# Patient Record
Sex: MALE | Race: WHITE | NOT HISPANIC OR LATINO | Employment: OTHER | URBAN - METROPOLITAN AREA
[De-identification: names, ages, dates, MRNs, and addresses within clinical notes are randomized per-mention and may not be internally consistent; named-entity substitution may affect disease eponyms.]

---

## 2017-06-02 ENCOUNTER — TELEPHONE (OUTPATIENT)
Dept: MEDICAL GROUP | Facility: PHYSICIAN GROUP | Age: 37
End: 2017-06-02

## 2017-06-02 NOTE — TELEPHONE ENCOUNTER
ESTABLISHED PATIENT PRE-VISIT PLANNING     Note: Patient will not be contacted if there is no indication to call.     1.  Reviewed notes from the last few office visits within the medical group: Yes    2.  If any orders were placed at last visit or intended to be done for this visit (i.e. 6 mos follow-up), do we have Results/Consult Notes?        •  Labs - Labs were not ordered at last office visit.       •  Imaging - Imaging was not ordered at last office visit.       •  Referrals - No referrals were ordered at last office visit.    3. Is this appointment scheduled as a Hospital Follow-Up? No    4.  Immunizations were updated in [employer] using WebIZ?: Yes       •  Web Iz Recommendations: Patient is up to date on all vaccines    5.  Patient is due for the following Health Maintenance Topics:   Health Maintenance Due   Topic Date Due   • Annual Wellness Visit  08/17/2016       - Patient is up-to-date on all Health Maintenance topics. No records have been requested at this time.    6.  Patient was not informed to arrive 15 min prior to their scheduled appointment and bring in their medication bottles.

## 2017-06-05 ENCOUNTER — OFFICE VISIT (OUTPATIENT)
Dept: MEDICAL GROUP | Facility: PHYSICIAN GROUP | Age: 37
End: 2017-06-05
Payer: MEDICARE

## 2017-06-05 VITALS
DIASTOLIC BLOOD PRESSURE: 80 MMHG | HEART RATE: 95 BPM | OXYGEN SATURATION: 94 % | BODY MASS INDEX: 19.99 KG/M2 | TEMPERATURE: 99.4 F | HEIGHT: 69 IN | SYSTOLIC BLOOD PRESSURE: 110 MMHG | WEIGHT: 135 LBS

## 2017-06-05 DIAGNOSIS — F17.200 SMOKER: ICD-10-CM

## 2017-06-05 DIAGNOSIS — I69.321 DYSPHASIA AS LATE EFFECT OF CEREBROVASCULAR ACCIDENT (CVA): ICD-10-CM

## 2017-06-05 DIAGNOSIS — I69.959 HEMIPLEGIA OF DOMINANT SIDE, LATE EFFECT OF CEREBROVASCULAR DISEASE (HCC): ICD-10-CM

## 2017-06-05 DIAGNOSIS — Z99.89 USE OF CANE AS AMBULATORY AID: ICD-10-CM

## 2017-06-05 DIAGNOSIS — I69.398 ABNORMALITY OF GAIT AS LATE EFFECT OF CEREBROVASCULAR ACCIDENT (CVA): ICD-10-CM

## 2017-06-05 DIAGNOSIS — I69.391 DYSPHAGIA AS LATE EFFECT OF CEREBROVASCULAR ACCIDENT (CVA): ICD-10-CM

## 2017-06-05 DIAGNOSIS — I69.392 FACIAL WEAKNESS AS LATE EFFECT OF CEREBROVASCULAR ACCIDENT (CVA): ICD-10-CM

## 2017-06-05 DIAGNOSIS — R26.9 ABNORMALITY OF GAIT AS LATE EFFECT OF CEREBROVASCULAR ACCIDENT (CVA): ICD-10-CM

## 2017-06-05 PROCEDURE — G8432 DEP SCR NOT DOC, RNG: HCPCS | Performed by: FAMILY MEDICINE

## 2017-06-05 PROCEDURE — 99214 OFFICE O/P EST MOD 30 MIN: CPT | Performed by: FAMILY MEDICINE

## 2017-06-05 PROCEDURE — G8599 NO ASA/ANTIPLAT THER USE RNG: HCPCS | Performed by: FAMILY MEDICINE

## 2017-06-05 PROCEDURE — 4004F PT TOBACCO SCREEN RCVD TLK: CPT | Mod: 8P | Performed by: FAMILY MEDICINE

## 2017-06-05 PROCEDURE — G8420 CALC BMI NORM PARAMETERS: HCPCS | Performed by: FAMILY MEDICINE

## 2017-06-05 RX ORDER — BACLOFEN 20 MG/1
20 TABLET ORAL 4 TIMES DAILY
Qty: 360 TAB | Refills: 4 | Status: SHIPPED | OUTPATIENT
Start: 2017-06-05 | End: 2017-06-05 | Stop reason: SDUPTHER

## 2017-06-05 RX ORDER — BACLOFEN 20 MG/1
20 TABLET ORAL 3 TIMES DAILY
Qty: 270 TAB | Refills: 1 | Status: SHIPPED | OUTPATIENT
Start: 2017-06-05 | End: 2017-09-15 | Stop reason: SDUPTHER

## 2017-06-05 ASSESSMENT — PATIENT HEALTH QUESTIONNAIRE - PHQ9: CLINICAL INTERPRETATION OF PHQ2 SCORE: 0

## 2017-06-05 NOTE — MR AVS SNAPSHOT
"        Fritz Bahman   2017 11:00 AM   Office Visit   MRN: [id number]    Department:  [facility]   Dept Phone:  [phone]    Description:  Male : 1980   Provider:  Verito Astorga M.D.           Reason for Visit     Tremors med refill for baclofen      Allergies as of 2017     Allergen Noted Reactions    Other Environmental 2015   Runny Nose, Itching    Pt is allergic pollen and gets itchy eyes and stuffy nose.          You were diagnosed with     History of CVA (cerebrovascular accident)   [091235]       Dysphasia as late effect of cerebrovascular accident (CVA)   [5130667]         Vital Signs     Blood Pressure Pulse Temperature Height Weight Body Mass Index    110/80 mmHg 95 37.4 °C (99.4 °F) 1.753 m (5' 9\") 61.236 kg (135 lb) 19.93 kg/m2    Oxygen Saturation Smoking Status                94% Current Every Day Smoker          Basic Information     Date Of Birth Sex Race Ethnicity Preferred Language    1980 Male White Non- English      Problem List              ICD-10-CM Priority Class Noted - Resolved    Arthralgia M25.50   2009 - Present    Spastic hemiplegia and hemiparesis (CMS-HCC) G81.10   2009 - Present    Neck pain M54.2   2009 - Present    History of CVA (cerebrovascular accident) Z86.73   2010 - Present    Vitamin d deficiency    10/3/2011 - Present      Health Maintenance        Date Due Completion Dates    IMM DTaP/Tdap/Td Vaccine (3 - Td) 2024, 1997, 1987            Current Immunizations     DTP 1987    Influenza Vaccine Quad Inj (Preserved) 10/25/2014    OPV - Historical Data 1987    TD Vaccine 1997    Tdap Vaccine 2014      Below and/or attached are the medications your provider expects you to take. Review all of your home medications and newly ordered medications with your provider and/or pharmacist. Follow medication instructions as directed by your provider and/or pharmacist. Please " keep your medication list with you and share with your provider. Update the information when medications are discontinued, doses are changed, or new medications (including over-the-counter products) are added; and carry medication information at all times in the event of emergency situations     Allergies:  OTHER ENVIRONMENTAL - Runny Nose,Itching               Medications  Valid as of: June 05, 2017 - 11:15 AM    Generic Name Brand Name Tablet Size Instructions for use    Baclofen (Tab) LIORESAL 20 MG Take 1 Tab by mouth 3 times a day.        .                 Medicines prescribed today were sent to:     [facility] [address] [place] [address], [place] - [address] [facility]    [address] [facility] [place] [address]    Phone: [phone] Fax: [phone]    Open 24 Hours?: No      Medication refill instructions:       If your prescription bottle indicates you have medication refills left, it is not necessary to call your provider’s office. Please contact your pharmacy and they will refill your medication.    If your prescription bottle indicates you do not have any refills left, you may request refills at any time through one of the following ways: The online [employer]Fast system (except Urgent Care), by calling your provider’s office, or by asking your pharmacy to contact your provider’s office with a refill request. Medication refills are processed only during regular business hours and may not be available until the next business day. Your provider may request additional information or to have a follow-up visit with you prior to refilling your medication.   *Please Note: Medication refills are assigned a new Rx number when refilled electronically. Your pharmacy may indicate that no refills were authorized even though a new prescription for the same medication is available at the pharmacy. Please request the medicine by name with the pharmacy before contacting your provider for a refill.        Referral     A referral request has  been sent to our patient care coordination department. Please allow 3-5 business days for us to process this request and contact you either by phone or mail. If you do not hear from us by the 5th business day, please call us at [phone].        Instructions    Take Vitamin D3 2000 IU every day.           MyChart Access Code: Activation code not generated  Current MyChart Status: Active          Quit Tobacco Information     Do you want to quit using tobacco?    Quitting tobacco decreases risks of cancer, heart and lung disease, increases life expectancy, improves sense of taste and smell, and increases spending money, among other benefits.    If you are thinking about quitting, we can help.  • [facility] Quit Tobacco Program: 315.186.7397  o Program occurs weekly for four weeks and includes pharmacist consultation on products to support quitting smoking or chewing tobacco. A provider referral is needed for pharmacist consultation.  • Tobacco Users Help Hotline: 7-800-QUIT-NOW (477-5896) or https://[place].quitlogix.org/  o Free, confidential telephone and online coaching for [place] residents. Sessions are designed on a schedule that is convenient for you. Eligible clients receive free nicotine replacement therapy.  • Nationally: www.smokefree.gov  o Information and professional assistance to support both immediate and long-term needs as you become, and remain, a non-smoker. Smokefree.gov allows you to choose the help that best fits your needs.

## 2017-06-05 NOTE — PROGRESS NOTES
"Subjective:     Chief Complaint   Patient presents with   • Spasms     med refill for baclofen     Fritz Ruggiero is a 36 y.o. 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 Residuals include dysphasia, dysphagia, facial weakness, abnormal gait and hemiplegia of right (dominant) UE and LE. He has spasticity well controlled with baclofen tid. No progressive deficits. No contractures.  No neurogenic bowel or bladder.   Uses cane for ambulation.  Takes no other meds.  Has known vit D deficiency. Not currently supplementing.  Current smoker. Not ready to quit.  Reports coffee and water \"go down the wrong pipe.\" Pt initially failed the swallow eval and had a PEG. Then released to oral diet but pt does not recall if supposed to thicken or not. Pt does not thicken liquids currently.      Allergies: Other environmental  Current medicines (including changes today)  Current Outpatient Prescriptions   Medication Sig Dispense Refill   • baclofen (LIORESAL) 20 MG tablet Take 1 Tab by mouth 3 times a day. 270 Tab 1     No current facility-administered medications for this visit.       He  has a past medical history of Autoimmune disease, not elsewhere classified; Arthralgia (6/29/2009); Spastic hemiplegia and hemiparesis (CMS-[facility]) (6/29/2009); CVA (cerebral vascular accident) (CMS-[facility]) (6/29/2009); Neck pain (6/29/2009); Shoulder pain (6/29/2009); Dysphagia as late effect of cerebrovascular accident (CVA) (6/5/2017); Facial weakness as late effect of cerebrovascular accident (CVA) (6/5/2017); Abnormality of gait as late effect of cerebrovascular accident (CVA) (6/5/2017); and Use of cane as ambulatory aid (6/5/2017).  Health Maintenance: current  ROS  Constitutional: Negative for fever, chills/sweats   Respiratory: Negative for shortness of breath, BRASHER  Cardiovascular: Negative for chest pain or pressure  GI/: Negative for diarrhea/constipation / urinary difficulty  All other systems reviewed " "and are negative except as per HPI.        Objective:     Blood pressure 110/80, pulse 95, temperature 37.4 °C (99.4 °F), height 1.753 m (5' 9\"), weight 61.236 kg (135 lb), SpO2 94 %. Body mass index is 19.93 kg/(m^2).  Physical Exam:  Alert, oriented in no acute distress.  Ambulates with cane.  Hemiplegia with atrophy of right UE and LE.  Eye contact is good, speech goal directed, affect calm  Facial asymmetry with weakness on right face.  Tongue deviates right.   Poor oral hygiene and dentition.  Neck No adenopathy or masses in the neck or supraclavicular regions.   Lungs: clear to auscultation bilaterally with good excursion.  CV: regular rate and rhythm.        Assessment and Plan:     Multiple late effects of CVA from 2003. No progression.  Secondary prevention: no meth.   Swallowing complaints concerning for aspiration. Referral initiated.   Treatment Changes: continue baclofen tid.    Fritz was seen today for spasms.    Diagnoses and all orders for this visit:    Spastic hemiplegia of right dominant side due to infarction of brain (CMS-HCC)  Comments:  continue bacofen tid.   Orders:  -     baclofen (LIORESAL) 20 MG tablet; Take 1 Tab by mouth 3 times a day.    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 Refer to speech to eval/recommend food texture  Orders:  -     REFERRAL TO SPEECH THERAPY Reason for Therapy: Eval/Treat/Report    Facial weakness as late effect of cerebrovascular accident (CVA)    Abnormality of gait as late effect of cerebrovascular accident (CVA)  Comments:  stable. Continue use of cane    Use of cane as ambulatory aid    Other orders  -    "  Discontinue: baclofen (LIORESAL) 20 MG tablet; Take 1 Tab by mouth 4 times a day.        Followup: Return in about 3 months (around 9/5/2017) for AWV with Health   and Dr Dennis. sooner should new symptoms or problems arise.

## 2017-08-30 ENCOUNTER — OFFICE VISIT (OUTPATIENT)
Dept: MEDICAL GROUP | Facility: PHYSICIAN GROUP | Age: 37
End: 2017-08-30
Payer: MEDICARE

## 2017-08-30 VITALS
TEMPERATURE: 99.5 F | BODY MASS INDEX: 19.4 KG/M2 | DIASTOLIC BLOOD PRESSURE: 86 MMHG | HEART RATE: 101 BPM | SYSTOLIC BLOOD PRESSURE: 140 MMHG | OXYGEN SATURATION: 98 % | WEIGHT: 131 LBS | HEIGHT: 69 IN

## 2017-08-30 DIAGNOSIS — I69.392 FACIAL WEAKNESS AS LATE EFFECT OF CEREBROVASCULAR ACCIDENT (CVA): ICD-10-CM

## 2017-08-30 DIAGNOSIS — I69.398 ABNORMALITY OF GAIT AS LATE EFFECT OF CEREBROVASCULAR ACCIDENT (CVA): ICD-10-CM

## 2017-08-30 DIAGNOSIS — R26.9 ABNORMALITY OF GAIT AS LATE EFFECT OF CEREBROVASCULAR ACCIDENT (CVA): ICD-10-CM

## 2017-08-30 DIAGNOSIS — I69.391 DYSPHAGIA AS LATE EFFECT OF CEREBROVASCULAR ACCIDENT (CVA): ICD-10-CM

## 2017-08-30 DIAGNOSIS — I69.959 HEMIPLEGIA OF DOMINANT SIDE, LATE EFFECT OF CEREBROVASCULAR DISEASE (HCC): ICD-10-CM

## 2017-08-30 DIAGNOSIS — Z99.89 USE OF CANE AS AMBULATORY AID: ICD-10-CM

## 2017-08-30 DIAGNOSIS — Z00.00 MEDICARE ANNUAL WELLNESS VISIT, SUBSEQUENT: Primary | ICD-10-CM

## 2017-08-30 PROCEDURE — G0439 PPPS, SUBSEQ VISIT: HCPCS | Performed by: NURSE PRACTITIONER

## 2017-08-30 ASSESSMENT — PATIENT HEALTH QUESTIONNAIRE - PHQ9: CLINICAL INTERPRETATION OF PHQ2 SCORE: 0

## 2017-08-30 NOTE — ASSESSMENT & PLAN NOTE
Chronic condition managed with current medical regimen  Unchanged since onset stroke   Continue with surveillance  Followed by Ernesto Shields M.D..

## 2017-08-30 NOTE — ASSESSMENT & PLAN NOTE
Onset of dysphagia ~ 2 months, occurred once with a liquid  Able to eat and swallow without difficulty  Followed by Citlali Dennis D.O..

## 2017-08-30 NOTE — PROGRESS NOTES
CC:   Medicare Annual Wellness Visit    HPI:  Fritz is a 36 y.o. here for Medicare Annual Wellness Visit    Patient Active Problem List    Diagnosis Date Noted   • Dysphagia as late effect of cerebrovascular accident (CVA) 06/05/2017   • Facial weakness as late effect of cerebrovascular accident (CVA) 06/05/2017   • Abnormality of gait as late effect of cerebrovascular accident (CVA) 06/05/2017   • Use of cane as ambulatory aid 06/05/2017   • Spastic hemiplegia of right dominant side due to infarction of brain (CMS-HCC) 06/05/2017   • Hemiplegia of dominant side, late effect of cerebrovascular disease (CMS-HCC) 06/16/2010     Current Outpatient Prescriptions   Medication Sig Dispense Refill   • baclofen (LIORESAL) 20 MG tablet Take 1 Tab by mouth 3 times a day. 270 Tab 1     No current facility-administered medications for this visit.       Current supplements: no  Chronic narcotic pain medicines: no  Allergies: Other environmental  Exercise: walks  Current social contact/activities: Has support of family and friends      Screening:  Depression Screening    Little interest or pleasure in doing things?  0 - not at all  Feeling down, depressed , or hopeless? 0 - not at all  Patient Health Questionnaire Score: 0     If depressive symptoms identified deferred to follow up visit unless specifically addressed in assessment and plan.    Interpretation of PHQ-9 Total Score   Score Severity   1-4 No Depression   5-9 Mild Depression   10-14 Moderate Depression   15-19 Moderately Severe Depression   20-27 Severe Depression    Screening for Cognitive Impairment    Three Minute Recall (apple, watch, solitario)  3/3    Draw clock face with all 12 numbers set to the hand to show 10 minutes past 11 o'clock  1 5  Cognitive concerns identified deferred for follow up unless specifically addressed in assessment and plan.    Fall Risk Assessment    Has the patient had two or more falls in the last year or any fall with injury in the last  year?  No    Safety Assessment    Throw rugs on floor.  Yes  Handrails on all stairs.  Yes  Good lighting in all hallways.  Yes  Difficulty hearing.  No  Patient counseled about all safety risks that were identified.    Functional Assessment ADLs    Are there any barriers preventing you from cooking for yourself or meeting nutritional needs?  No.    Are there any barriers preventing you from driving safely or obtaining transportation?  No.    Are there any barriers preventing you from using a telephone or calling for help?  No.    Are there any barriers preventing you from shopping?  No.    Are there any barriers preventing you from taking care of your own finances?  No.    Are there any barriers preventing you from managing your medications?  No.    Are currently engaging any exercise or physical activity?  Yes.       Health Maintenance Summary                Annual Wellness Visit Overdue 8/17/2016      Done 8/17/2015 Visit Dx: Medicare annual wellness visit, subsequent    IMM INFLUENZA Next Due 9/1/2017      Done 10/25/2014 Imm Admin: Influenza Vaccine Quad Inj (Preserved)    IMM DTaP/Tdap/Td Vaccine Next Due 12/30/2024      Done 12/30/2014 Imm Admin: Tdap Vaccine     Patient has more history with this topic...          Patient Care Team:  Citlali Dennis D.O. as PCP - General (Family Medicine)        Social History   Substance Use Topics   • Smoking status: Former Smoker     Packs/day: 0.50     Years: 10.00     Types: Cigarettes     Quit date: 8/23/2017   • Smokeless tobacco: Never Used   • Alcohol use No       Family History   Problem Relation Age of Onset   • Diabetes Mother    • Hypertension Mother    • Cancer Paternal Grandfather      prostate     He  has a past medical history of Abnormality of gait as late effect of cerebrovascular accident (CVA) (6/5/2017); Arthralgia (6/29/2009); Autoimmune disease, not elsewhere classified; CVA (cerebral vascular accident) (CMS-HCC) (6/29/2009); Dysphagia as late effect  "of cerebrovascular accident (CVA) (6/5/2017); Facial weakness as late effect of cerebrovascular accident (CVA) (6/5/2017); Neck pain (6/29/2009); Shoulder pain (6/29/2009); Spastic hemiplegia and hemiparesis (CMS-HCC) (6/29/2009); and Use of cane as ambulatory aid (6/5/2017). He also has no past medical history of Encounter for long-term (current) use of other medications.   No past surgical history on file.    ROS:    Ostomy or other tubes or amputations: no  Chronic oxygen use no  Last eye exam ~20yrs ago, recom eye health exam  : Denies incontinence.   Gait: Uses a cane prn  Hearing good  Dentition fair    Exam: Blood pressure 140/86, pulse (!) 101, temperature 37.5 °C (99.5 °F), height 1.753 m (5' 9\"), weight 59.4 kg (131 lb), SpO2 98 %. Body mass index is 19.35 kg/m².  Alert, oriented in no acute distress.  Eye contact is good, speech goal directed, affect calm      Assessment and Plan. The following treatment and monitoring plan is recommended for all problems as listed below:   Hemiplegia of dominant side, late effect of cerebrovascular disease (CMS-HCC)  /86  Occurrence at age 22  No change is hemiplegia  Followed by Dr Dennis..     Vitamin d deficiency  Historical data    Dysphagia as late effect of cerebrovascular accident (CVA)  Onset of dysphagia ~ 2 months, occurred once with a liquid  Able to eat and swallow without difficulty  Followed by Citlali Dennis D.O..       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        Use of cane as ambulatory aid  For safety with mobility  H/o cva  hemiplegia    Spastic hemiplegia of right dominant side due to infarction of brain (CMS-HCC)  /86  Occurrence at age 22  No change is " hemiplegia  Followed by Ernesto Shields M.D      Health Care Screening recommendations reviewed with patient today: per Patient Instructions  DPA/Advanced directive: .has NO advanced directive - not interested in additional information    Next office visit for recheck of chronic medical conditions is due with Citlali Dennis D.O. in 4 months

## 2017-08-30 NOTE — PATIENT INSTRUCTIONS
Continue with care through Citlali Dennis D.O..  Next Medicare Annual Wellness Visit is due in one year.    Continue care with specialists you are seeing for your chronic problems.    Attached is some preventative information for you to read.          Fall Prevention and Home Safety  Falls cause injuries and can affect all age groups. It is possible to prevent falls.   HOW TO PREVENT FALLS  · Wear shoes with rubber soles that do not have an opening for your toes.   · Keep the inside and outside of your house well lit.   · Use night lights throughout your home.   · Remove clutter from floors.   · Clean up floor spills.   · Remove throw rugs or fasten them to the floor with carpet tape.   · Do not place electrical cords across pathways.   · Put grab bars by your tub, shower, and toilet. Do not use towel bars as grab bars.   · Put handrails on both sides of the stairway. Fix loose handrails.   · Do not climb on stools or stepladders, if possible.   · Do not wax your floors.   · Repair uneven or unsafe sidewalks, walkways, or stairs.   · Keep items you use a lot within reach.   · Be aware of pets.   · Keep emergency numbers next to the telephone.   · Put smoke detectors in your home and near bedrooms.   Ask your doctor what other things you can do to prevent falls.  Document Released: 10/14/2010 Document Revised: 06/18/2013 Document Reviewed: 03/19/2013  ExitCare® Patient Information ©2013 [employer].    Exercise to Stay Healthy      Exercise helps you become and stay healthy.  EXERCISE IDEAS AND TIPS  Choose exercises that:  · You enjoy.   · Fit into your day.   You do not need to exercise really hard to be healthy. You can do exercises at a slow or medium level and stay healthy. You can:  · Stretch before and after working out.   · Try yoga, Pilates, or chiki chi.   · Lift weights.   · Walk fast, swim, jog, run, climb stairs, bicycle, dance, or rollerskate.   · Take aerobic classes.   Exercises that burn about 150  calories:  · Running 1 ½ miles in 15 minutes.   · Playing volleyball for 45 to 60 minutes.   · Washing and waxing a car for 45 to 60 minutes.   · Playing touch football for 45 minutes.   · Walking 1 ¾ miles in 35 minutes.   · Pushing a stroller 1 ½ miles in 30 minutes.   · Playing basketball for 30 minutes.   · Raking leaves for 30 minutes.   · Bicycling 5 miles in 30 minutes.   · Walking 2 miles in 30 minutes.   · Dancing for 30 minutes.   · Shoveling snow for 15 minutes.   · Swimming laps for 20 minutes.   · Walking up stairs for 15 minutes.   · Bicycling 4 miles in 15 minutes.   · Gardening for 30 to 45 minutes.   · Jumping rope for 15 minutes.   · Washing windows or floors for 45 to 60 minutes.     One suggestion is to start walking for 10 minutes after each meal.  This will help with digestion and help you to metabolize your meal.       For Weight Loss -   Recommend portion sizes with more fruits/vegetables/high fiber foods.  Stay away from white bread/pastas/white rice/white potatoes.  Increase Fluid intake to 6-8 glasses of water daily.   Eliminate soda's (diet and regular) from your fluid intake.      Document Released: 01/20/2012 Document Revised: 03/11/2013 Document Reviewed: 01/20/2012  ExitCare® Patient Information ©2013 [employer].    Fat and Cholesterol Control Diet  Cholesterol is a wax-like substance. It comes from your liver and is found in certain foods. There is good (HDL) and bad (LDL) cholesterol. Too much cholesterol in your blood can affect your heart. Certain foods can lower or raise your cholesterol. Eat foods that are low in cholesterol.  Saturated and trans fats are bad fats found in foods that will raise your cholesterol. Do not eat foods that are high in saturated and trans fats.  FOODS HIGHER IN SATURATED AND TRANS FATS  · Dairy products, such as whole milk, eggs, cheese, cream, and butter.   · Fatty meats, such as hot dogs, sausage, and salami.   · Fried foods.   · Trans fats which  are found in margarine and pre-made cookies, crackers, and baked goods.   · Tropical oils, such as coconut and palm oils.   Read package labels at the store. Do not buy products that use saturated or trans fats or hydrogenated oils. Find foods labeled:  · Low-fat.   · Low-saturated fat.   · Trans-fat-free.   · Low-cholesterol.   FOODS LOWER IN CHOLESTEROL  ·  Fruit.   · Vegetables.   · Beans, peas, and lentils.   · Fish.   · Lean meat, such as chicken (without skin) or ground turkey.   · Grains, such as barley, rice, couscous, bulgur wheat, and pasta.   · Heart-healthy tub margarine.   PREPARING YOUR FOOD  · Broil, bake, steam, or roast foods. Do not martínez food.   · Use non-stick cooking sprays.   · Use lemon or herbs to flavor food instead of using butter or stick margarine.   · Use nonfat yogurt, salsa, or low-fat dressings for salads.   Document Released: 06/18/2013 Document Reviewed: 06/18/2013  ExitCare® Patient Information ©2013 [employer].    Recommend annual flu vaccine.  Next due in Fall, 2017.  If you decide not to have the flu vaccine, recommend good handwashing and staying out of crowds during flu season.

## 2017-09-15 ENCOUNTER — OFFICE VISIT (OUTPATIENT)
Dept: MEDICAL GROUP | Facility: PHYSICIAN GROUP | Age: 37
End: 2017-09-15
Payer: MEDICARE

## 2017-09-15 VITALS
HEIGHT: 69 IN | TEMPERATURE: 99.1 F | RESPIRATION RATE: 16 BRPM | SYSTOLIC BLOOD PRESSURE: 126 MMHG | BODY MASS INDEX: 19.4 KG/M2 | WEIGHT: 131 LBS | DIASTOLIC BLOOD PRESSURE: 80 MMHG | HEART RATE: 112 BPM | OXYGEN SATURATION: 97 %

## 2017-09-15 DIAGNOSIS — I69.392 FACIAL WEAKNESS AS LATE EFFECT OF CEREBROVASCULAR ACCIDENT (CVA): ICD-10-CM

## 2017-09-15 DIAGNOSIS — I69.391 DYSPHAGIA AS LATE EFFECT OF CEREBROVASCULAR ACCIDENT (CVA): ICD-10-CM

## 2017-09-15 DIAGNOSIS — Z23 NEED FOR VACCINATION: ICD-10-CM

## 2017-09-15 DIAGNOSIS — R26.9 ABNORMALITY OF GAIT AS LATE EFFECT OF CEREBROVASCULAR ACCIDENT (CVA): ICD-10-CM

## 2017-09-15 DIAGNOSIS — I69.398 ABNORMALITY OF GAIT AS LATE EFFECT OF CEREBROVASCULAR ACCIDENT (CVA): ICD-10-CM

## 2017-09-15 DIAGNOSIS — Z13.6 SCREENING FOR CARDIOVASCULAR CONDITION: ICD-10-CM

## 2017-09-15 PROCEDURE — G0008 ADMIN INFLUENZA VIRUS VAC: HCPCS | Performed by: FAMILY MEDICINE

## 2017-09-15 PROCEDURE — 90686 IIV4 VACC NO PRSV 0.5 ML IM: CPT | Performed by: FAMILY MEDICINE

## 2017-09-15 PROCEDURE — 99214 OFFICE O/P EST MOD 30 MIN: CPT | Mod: 25 | Performed by: FAMILY MEDICINE

## 2017-09-15 RX ORDER — BACLOFEN 20 MG/1
20 TABLET ORAL 2 TIMES DAILY
Qty: 60 TAB | Refills: 11 | Status: SHIPPED | OUTPATIENT
Start: 2017-09-15 | End: 2018-11-05 | Stop reason: SDUPTHER

## 2017-09-15 NOTE — PROGRESS NOTES
Subjective:     Chief Complaint   Patient presents with   • Establish Care       Fritz Ruggiero is a 36 y.o. male here today today to [facility] care with new provider. Patient is a former patient of Dr. Shields who is since retired.  Patient has a significant medical history of CVA in . Since then patient has had right hemiplegia with contracture of right upper extremity. Patient denies any new or worsening symptoms.  Pt is now taking baclofen BID and using medical marijuana with CBD oil. Patient requests refitting of leg brace.      Allergies   Allergen Reactions   • Other Environmental Runny Nose and Itching     Pt is allergic pollen and gets itchy eyes and stuffy nose.         Current medicines (including changes today)  Current Outpatient Prescriptions   Medication Sig Dispense Refill   • baclofen (LIORESAL) 20 MG tablet Take 1 Tab by mouth 2 times a day. 60 Tab 11     No current facility-administered medications for this visit.      Social History   Substance Use Topics   • Smoking status: Former Smoker     Packs/day: 0.50     Years: 10.00     Types: Cigarettes     Quit date: 2017   • Smokeless tobacco: Never Used   • Alcohol use No     Family Status   Relation Status   • Mother Alive, age 58y   • Father Alive, age 64y   • Brother Alive   • Brother Alive   • Paternal Grandfather      Family History   Problem Relation Age of Onset   • Diabetes Mother    • Hypertension Mother    • Cancer Paternal Grandfather      prostate     He    has a past medical history of Abnormality of gait as late effect of cerebrovascular accident (CVA) (2017); Arthralgia (2009); Autoimmune disease, not elsewhere classified; CVA (cerebral vascular accident) (CMS-HCC) (2003); Dysphagia as late effect of cerebrovascular accident (CVA) (); Facial weakness as late effect of cerebrovascular accident (CVA) (); Neck pain (2009); Shoulder pain (2009); Spastic hemiplegia and hemiparesis (CMS-HCC)  "(2003); and Use of cane as ambulatory aid (2003). He also has no past medical history of Encounter for long-term (current) use of other medications.        ROS   GEN: no weight loss, fevers, or chills  HEENT: no blurry vision or change in vision, no ear pain, + difficulty swallowing, no throat pain, no runny nose, no nasal congestion  Resp: no shortness of breath, no cough  CV: no racing heart, no irregular beats, no chest pain  Abd: no nausea, no vomiting, no diarrhea, no constipation, no blood in stool, no dark stools, no incontinence  : no dysuria, no hematuria, no urinary incontinence, no increased frequency  MSK: no muscle aches, no joint pain  Neuro: no headaches, no dizziness, no LOC, R sided weakness     Objective:     Blood pressure 126/80, pulse (!) 112, temperature 37.3 °C (99.1 °F), resp. rate 16, height 1.753 m (5' 9\"), weight 59.4 kg (131 lb), SpO2 97 %. Body mass index is 19.35 kg/m².   Physical Exam:  Constitutional: Alert, no distress.  Skin: Warm, dry, good turgor, no rashes in visible areas.  Eye: Equal, round and reactive, conjunctiva clear, lids normal.  ENMT: Lips without lesions, oropharynx non-erythematous, no exudate, Poor dentition, moist oral mucosa, bilateral tympanic membranes: No bulging, no retraction, no fluid, nonerythematous, positive light reflex, external auditory canals: Clear, scant cerumen, nonerythematous  Neck: Trachea midline, no masses, no thyromegaly. No cervical or supraclavicular lymphadenopathy. Full ROM  Respiratory: Unlabored respiratory effort, good air movement, lungs clear to auscultation, no wheezes, no ronchi.  Cardiovascular:RRR, +S1, S2, no murmur, no peripheral edema, pedal and radial pulses equal and intact bilat  Abdomen: Soft, non-tender, no masses, no hepatosplenomegaly.  MSK:5/5 muscle strength in L  Upper and lower extremities,  Right hand upper extremity 0/5, contracture, right lower extremity: Hip 3/5, knee 2/5 foot 1/5   Psych: Alert and oriented, " appropriate affect and mood.  Neuro: Diminished sensation over right side, right facial droop  Assessment and Plan:   The following treatment plan was discussed    1. Spastic hemiplegia of right dominant side due to infarction of brain (CMS-HCC)  baclofen (LIORESAL) 20 MG tablet    REFERRAL TO OTHER    COMP METABOLIC PANEL    continue bacofen tid.    2. 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 Dysphagia as late effect of cerebrovascular accident (CVA)  REFERRAL TO OTHER    COMP METABOLIC PANEL   4. Abnormality of gait as late effect of cerebrovascular accident (CVA)  REFERRAL TO OTHER    COMP METABOLIC PANEL   5. Screening for cardiovascular condition  LIPID PROFILE   6. Need for vaccination  INFLUENZA VACCINE QUAD INJ >3Y(PF)     Patient reports no significant changes. Declines referral to physical therapy or occupational therapy. Patient requests new bracing. Referral created.  I discussed benefits and side effects of each vaccine with patient, and I answered all patient's questions about vaccines.      Followup: Return in about 6 months (around 3/15/2018) for chronic conditions.    Please note that this dictation was created using voice recognition software. I have made every reasonable attempt to correct obvious errors, but I expect that there are errors of grammar and possibly content that I did not discover before finalizing the note.

## 2018-11-05 ENCOUNTER — OFFICE VISIT (OUTPATIENT)
Dept: MEDICAL GROUP | Facility: PHYSICIAN GROUP | Age: 38
End: 2018-11-05
Payer: MEDICARE

## 2018-11-05 VITALS
OXYGEN SATURATION: 97 % | TEMPERATURE: 99.8 F | SYSTOLIC BLOOD PRESSURE: 110 MMHG | DIASTOLIC BLOOD PRESSURE: 60 MMHG | WEIGHT: 141 LBS | BODY MASS INDEX: 21.37 KG/M2 | HEIGHT: 68 IN | HEART RATE: 82 BPM

## 2018-11-05 DIAGNOSIS — Z13.6 SCREENING FOR CARDIOVASCULAR CONDITION: ICD-10-CM

## 2018-11-05 DIAGNOSIS — I69.392 FACIAL WEAKNESS AS LATE EFFECT OF CEREBROVASCULAR ACCIDENT (CVA): ICD-10-CM

## 2018-11-05 DIAGNOSIS — M25.471 ANKLE SWELLING, RIGHT: ICD-10-CM

## 2018-11-05 DIAGNOSIS — Z23 NEED FOR VACCINATION: ICD-10-CM

## 2018-11-05 PROCEDURE — 90686 IIV4 VACC NO PRSV 0.5 ML IM: CPT | Performed by: PHYSICIAN ASSISTANT

## 2018-11-05 PROCEDURE — 99214 OFFICE O/P EST MOD 30 MIN: CPT | Mod: 25 | Performed by: PHYSICIAN ASSISTANT

## 2018-11-05 PROCEDURE — G0008 ADMIN INFLUENZA VIRUS VAC: HCPCS | Performed by: PHYSICIAN ASSISTANT

## 2018-11-05 RX ORDER — BACLOFEN 20 MG/1
20 TABLET ORAL 2 TIMES DAILY
Qty: 180 TAB | Refills: 1 | Status: SHIPPED | OUTPATIENT
Start: 2018-11-05 | End: 2019-05-10 | Stop reason: SDUPTHER

## 2018-11-05 ASSESSMENT — PATIENT HEALTH QUESTIONNAIRE - PHQ9: CLINICAL INTERPRETATION OF PHQ2 SCORE: 0

## 2018-11-07 NOTE — PROGRESS NOTES
Subjective:   Fritz Ruggiero is a 38 y.o. male here today for follow-up on spasticity, Baclofen refills. He is a former patient of Citlali Dennis DO. Will be establishing care with Neema Rodríguez MD next month.    HPI:    Patient presents to the office today requesting refills of baclofen.  He states he has been on this medication consistently since previous stroke in 2003.  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  Has had chronic right-sided spastic hemiplegia, right upper > lower extremity numbness, and facial weakness since that time.  He also endorses tremors of his right side, mostly of his leg, that are improved significantly with the baclofen.  He is currently taking 20 mg twice daily.  Was at one time taking 3-4 times a day but had to cut back on use due to sedation.  Other than this, he denies any other side effects from the medication.  He has not undergone any type of physical therapy since his initial hospitalization.  Was previously using cane to help with ambulation but does not anymore.    Patient would also like me to look at his right ankle. He states he has noticed intermittent swelling of his right ankle for the last 3-4 weeks. Denies any swelling currently. Denies any pain associated with swelling. Does have chronic diminished sensation since stroke, however. He can't recall an injury.      Current medicines (including changes today)  Current Outpatient Prescriptions   Medication Sig Dispense Refill   • baclofen (LIORESAL) 20 MG tablet Take 1 Tab by mouth 2 times a day. 180 Tab 1     No current facility-administered medications for this visit.      He  has a past medical history of Abnormality of gait as late effect of cerebrovascular accident (CVA) (6/5/2017); Arthralgia (6/29/2009); Autoimmune disease NEC; CVA (cerebral vascular accident) (HCC) (02/28/2003); Dysphagia as late effect of cerebrovascular accident (CVA) (2003); Facial weakness as late  "effect of cerebrovascular accident (CVA) (2003); Neck pain (6/29/2009); Shoulder pain (6/29/2009); Spastic hemiplegia and hemiparesis (HCC) (2003); and Use of cane as ambulatory aid (2003). He also has no past medical history of Encounter for long-term (current) use of other medications.    ROS  Constitutional ROS: Positive for sedation - attributes to Baclofen use  Musculoskeletal/Extremities ROS: Positive for intermittent edema of right ankle. Denies pain.  Neurologic ROS: Positive for numbness, weakness        Objective:     Blood pressure 110/60, pulse 82, temperature 37.7 °C (99.8 °F), height 1.727 m (5' 8\"), weight 64 kg (141 lb), SpO2 97 %. Body mass index is 21.44 kg/m².     Physical Exam:  Constitutional: Alert, pleasant, no distress.  Skin: Warm, dry, good turgor, no rashes in visible areas.  Eye: Pupils are equal and round, conjunctiva clear, lids normal.  ENMT: Lips without lesions, moist mucus membranes.  Neck: No masses. No submandibular or cervical lymphadenopathy.  Respiratory: Unlabored respiratory effort, lungs clear to auscultation, no wheezes, no rhonchi.  Cardiovascular: Normal S1, S2, no murmur, no lower extremity edema.  Neurologic: Fully oriented, mild dysarthria noted. Spastic flexion noted of right elbow. +UE/LE weakness. Unable to dorsiflex/plantar flex right foot.  Musculoskeletal: Focused performed to the right ankle. No deformity/edema noted. Non-tender to palpation. Unable to move ankle at all (which he states is chronic since stroke).      Assessment and Plan:   The following treatment plan was discussed    1. Spastic hemiplegia of right dominant side due to infarction of brain (HCC)  Chronic issue, stable since prior stroke, improved with daily Baclofen use. Refills ordered.  - baclofen (LIORESAL) 20 MG tablet; Take 1 Tab by mouth 2 times a day.  Dispense: 180 Tab; Refill: 1    2. 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     3. Ankle swelling, right  New problem, intermittent x 3 weeks per patient. No visible edema or deformity on exam. No tenderness. No injury that he can recall. Do not recommend any treatment at this time given lack of any discomfort. Should simply monitor. Possible that he suffered mild sprain/strain which should resolve on its own with time.    3. Screening for cardiovascular condition  Advised to have screening labs completed before upcoming visit to establish care.  - COMP METABOLIC PANEL; Future  - LIPID PROFILE; Future    4. Need for vaccination  - Influenza Vaccine Quad Injection >3Y (PF)      Followup: Return for establish care as scheduled.    Tequila Rodriguez P.A.-C.

## 2018-12-10 ENCOUNTER — TELEPHONE (OUTPATIENT)
Dept: MEDICAL GROUP | Facility: PHYSICIAN GROUP | Age: 38
End: 2018-12-10

## 2018-12-10 ENCOUNTER — HOSPITAL ENCOUNTER (OUTPATIENT)
Dept: LAB | Facility: MEDICAL CENTER | Age: 38
End: 2018-12-10
Attending: PHYSICIAN ASSISTANT
Payer: MEDICARE

## 2018-12-10 DIAGNOSIS — Z13.6 SCREENING FOR CARDIOVASCULAR CONDITION: ICD-10-CM

## 2018-12-10 PROCEDURE — 80061 LIPID PANEL: CPT

## 2018-12-10 PROCEDURE — 80053 COMPREHEN METABOLIC PANEL: CPT

## 2018-12-10 PROCEDURE — 36415 COLL VENOUS BLD VENIPUNCTURE: CPT

## 2018-12-10 NOTE — TELEPHONE ENCOUNTER
Future Appointments       Provider Department Center    12/11/2018 9:15 AM Neema Rodríguez M.D. [facility]        ESTABLISHED PATIENT PRE-VISIT PLANNING     Patient was NOT contacted to complete PVP.     Note: Patient will not be contacted if there is no indication to call.     1.  Reviewed notes from the last few office visits within the medical group: Yes    2.  If any orders were placed at last visit or intended to be done for this visit (i.e. 6 mos follow-up), do we have Results/Consult Notes?        •  Labs - Labs ordered, NOT completed. Patient advised to complete prior to next appointment.         •  Imaging - Imaging was not ordered at last office visit.       •  Referrals - No referrals were ordered at last office visit.    3. Is this appointment scheduled as a Hospital Follow-Up? No    4.  Immunizations were updated in Epic using WebIZ?: Yes       •  Web Iz Recommendations: VARICELLA (Chicken Pox)     5.  Patient is due for the following Health Maintenance Topics:   Health Maintenance Due   Topic Date Due   • Annual Wellness Visit  08/31/2018       6.  MDX printed for Provider? NO    7.  Patient was informed to arrive 15 min prior to their scheduled appointment and bring in their medication bottles. Confirmed through automated call

## 2018-12-11 ENCOUNTER — OFFICE VISIT (OUTPATIENT)
Dept: MEDICAL GROUP | Facility: PHYSICIAN GROUP | Age: 38
End: 2018-12-11
Payer: MEDICARE

## 2018-12-11 VITALS
DIASTOLIC BLOOD PRESSURE: 88 MMHG | TEMPERATURE: 98.1 F | HEART RATE: 68 BPM | SYSTOLIC BLOOD PRESSURE: 124 MMHG | RESPIRATION RATE: 18 BRPM | HEIGHT: 69 IN | OXYGEN SATURATION: 98 % | WEIGHT: 140 LBS | BODY MASS INDEX: 20.73 KG/M2

## 2018-12-11 LAB
ALBUMIN SERPL BCP-MCNC: 5.1 G/DL (ref 3.2–4.9)
ALBUMIN/GLOB SERPL: 2 G/DL
ALP SERPL-CCNC: 61 U/L (ref 30–99)
ALT SERPL-CCNC: 12 U/L (ref 2–50)
ANION GAP SERPL CALC-SCNC: 7 MMOL/L (ref 0–11.9)
AST SERPL-CCNC: 16 U/L (ref 12–45)
BILIRUB SERPL-MCNC: 1.5 MG/DL (ref 0.1–1.5)
BUN SERPL-MCNC: 13 MG/DL (ref 8–22)
CALCIUM SERPL-MCNC: 9.8 MG/DL (ref 8.5–10.5)
CHLORIDE SERPL-SCNC: 107 MMOL/L (ref 96–112)
CHOLEST SERPL-MCNC: 180 MG/DL (ref 100–199)
CO2 SERPL-SCNC: 28 MMOL/L (ref 20–33)
CREAT SERPL-MCNC: 1.03 MG/DL (ref 0.5–1.4)
FASTING STATUS PATIENT QL REPORTED: NORMAL
GLOBULIN SER CALC-MCNC: 2.5 G/DL (ref 1.9–3.5)
GLUCOSE SERPL-MCNC: 92 MG/DL (ref 65–99)
HDLC SERPL-MCNC: 57 MG/DL
LDLC SERPL CALC-MCNC: 108 MG/DL
POTASSIUM SERPL-SCNC: 3.5 MMOL/L (ref 3.6–5.5)
PROT SERPL-MCNC: 7.6 G/DL (ref 6–8.2)
SODIUM SERPL-SCNC: 142 MMOL/L (ref 135–145)
TRIGL SERPL-MCNC: 77 MG/DL (ref 0–149)

## 2018-12-11 PROCEDURE — 99214 OFFICE O/P EST MOD 30 MIN: CPT | Performed by: FAMILY MEDICINE

## 2018-12-11 RX ORDER — GABAPENTIN 100 MG/1
100 CAPSULE ORAL 2 TIMES DAILY PRN
Qty: 60 CAP | Refills: 2 | Status: SHIPPED | OUTPATIENT
Start: 2018-12-11 | End: 2019-08-19

## 2018-12-11 NOTE — PROGRESS NOTES
"cc: muscle cramp      Subjective:     Fritz Ruggiero is a 38 y.o. male presenting for the following:     Patient's only regular medication is baclofen. He takes this for muscle tightness and pain in his right arm. He has had this issue since his stroke in 2003. He regained most of the function in his right leg but his right arm with continued spasticity. It is worse in the cold months.     He feels that the baclofen has lost some of it's effectiveness. He has a little constant pain and tingling in the arm/hand that then gets worse with cramps. He does try to stretch the arm everyday.     He was on a higher dose of baclofen in the past, but became too sedated on this.     Review of systems:  All others reviewed and are negative.       Current Outpatient Prescriptions:   •  gabapentin (NEURONTIN) 100 MG Cap, Take 1 Cap by mouth 2 times a day as needed., Disp: 60 Cap, Rfl: 2  •  baclofen (LIORESAL) 20 MG tablet, Take 1 Tab by mouth 2 times a day., Disp: 180 Tab, Rfl: 1    Allergies, past medical history, past surgical history, family history, social history reviewed and updated    Objective:     Vitals: /88 (BP Location: Left arm, Patient Position: Sitting, BP Cuff Size: Adult)   Pulse 68   Temp 36.7 °C (98.1 °F) (Temporal)   Resp 18   Ht 1.753 m (5' 9\")   Wt 63.5 kg (140 lb)   SpO2 98%   BMI 20.67 kg/m²   General: Alert, pleasant, NAD  HEENT: Normocephalic.   EOMI, no icterus or pallor.  Conjunctivae and lids normal. External ears normal.  Neck supple.  No thyromegaly or masses palpated. No cervical or supraclavicular lymphadenopathy.  Heart: Regular rate and rhythm.  S1 and S2 normal.  No murmurs appreciated.  Respiratory: Normal respiratory effort.  Clear to auscultation bilaterally.  Abdomen: Non-distended, soft  Skin: Warm, dry, no rashes.  Musculoskeletal: Gait is normal.  Moves all extremities well.  Extremities: No leg edema.    Neurological: contracture of right arm. Facial droop  Psych:  Affect " is normal, grooming is appropriate.    Assessment/Plan:     Fritz was seen today for annual exam.    Diagnoses and all orders for this visit:    Spastic hemiplegia of right dominant side due to infarction of brain (HCC): patient not feeling like baclofen as helpful. But still able to work and pain is not limiting function. Will trial adding low dose gabapentin. If this not effective, may consider increasing dose of baclofen (max dose of 80mg daily) but patient too sedated with this in the past. Patient not interested in re-referral to neurology currently.   Discussed common side effects of gabapentin.     Other orders  -     gabapentin (NEURONTIN) 100 MG Cap; Take 1 Cap by mouth 2 times a day as needed.      Return in about 6 months (around 6/11/2019), or if symptoms worsen or fail to improve.

## 2019-05-13 RX ORDER — BACLOFEN 20 MG/1
TABLET ORAL
Qty: 180 TAB | Refills: 1 | Status: SHIPPED | OUTPATIENT
Start: 2019-05-13 | End: 2019-08-19

## 2019-08-19 ENCOUNTER — OFFICE VISIT (OUTPATIENT)
Dept: MEDICAL GROUP | Facility: PHYSICIAN GROUP | Age: 39
End: 2019-08-19
Payer: MEDICARE

## 2019-08-19 VITALS
HEIGHT: 69 IN | DIASTOLIC BLOOD PRESSURE: 74 MMHG | OXYGEN SATURATION: 95 % | HEART RATE: 78 BPM | WEIGHT: 140 LBS | SYSTOLIC BLOOD PRESSURE: 110 MMHG | RESPIRATION RATE: 18 BRPM | TEMPERATURE: 99 F | BODY MASS INDEX: 20.73 KG/M2

## 2019-08-19 DIAGNOSIS — G81.90 HEMIPLEGIA AFFECTING DOMINANT SIDE (HCC): ICD-10-CM

## 2019-08-19 PROCEDURE — 99213 OFFICE O/P EST LOW 20 MIN: CPT | Performed by: FAMILY MEDICINE

## 2019-08-19 RX ORDER — BACLOFEN 20 MG/1
20 TABLET ORAL 2 TIMES DAILY
Qty: 180 TAB | Refills: 1 | Status: SHIPPED | OUTPATIENT
Start: 2019-08-19 | End: 2020-03-24

## 2019-08-19 ASSESSMENT — PATIENT HEALTH QUESTIONNAIRE - PHQ9: CLINICAL INTERPRETATION OF PHQ2 SCORE: 0

## 2019-08-19 NOTE — PROGRESS NOTES
"cc: hemiplegia      Subjective:     Fritz Ruggiero is a 38 y.o. male presenting for the following:     Patient with history of hemiplegia over right arm due to stroke more than 10 years ago. His symptoms have been stable.  He does have some right leg weakness but this is mild and he is able to walk.  He does take baclofen twice daily for the muscle cramps/tightness. He tried gabapentin but this negatively affected his mood.  It also made him too sleepy.    Review of systems:  All others reviewed and are negative.       Current Outpatient Medications:   •  baclofen (LIORESAL) 20 MG tablet, Take 1 Tab by mouth 2 times a day., Disp: 180 Tab, Rfl: 1    Allergies, past medical history, past surgical history, family history, social history reviewed and updated    Objective:     Vitals: /74 (BP Location: Left arm, Patient Position: Sitting, BP Cuff Size: Adult)   Pulse 78   Temp 37.2 °C (99 °F) (Temporal)   Resp 18   Ht 1.753 m (5' 9\")   Wt 63.5 kg (140 lb)   SpO2 95%   BMI 20.67 kg/m²   General: Alert, pleasant, NAD  Neurological:  right arm with spastic flexion and no movement in hand. Right leg with decreased strength when compared to left but able to weight bear and walk. Ankle set at 90 degrees.     Assessment/Plan:     Fritz was seen today for medication refill and other.    Diagnoses and all orders for this visit:    Hemiplegia affecting dominant side (HCC): Symptoms stable since stroke many years ago.  Patient is interested in getting 's license again so will refer to occupational therapy for pre-driving evaluation.  Muscle cramping and tremor treated with baclofen twice daily.  Will refill.  -     REFERRAL TO OCCUPATIONAL THERAPY Reason for Therapy: Eval/Treat/Report    Other orders  -     baclofen (LIORESAL) 20 MG tablet; Take 1 Tab by mouth 2 times a day.        Return if symptoms worsen or fail to improve.  "

## 2019-10-10 ENCOUNTER — OCCUPATIONAL THERAPY (OUTPATIENT)
Dept: OCCUPATIONAL THERAPY | Facility: REHABILITATION | Age: 39
End: 2019-10-10
Attending: FAMILY MEDICINE
Payer: MEDICARE

## 2019-10-10 DIAGNOSIS — G81.90 HEMIPLEGIA AFFECTING DOMINANT SIDE (HCC): ICD-10-CM

## 2019-10-10 PROCEDURE — 97750 PHYSICAL PERFORMANCE TEST: CPT

## 2019-10-10 ASSESSMENT — BALANCE ASSESSMENTS
BALANCE - STANDING DYNAMIC: FAIR +
BALANCE - SITTING STATIC: GOOD
BALANCE - STANDING STATIC: FAIR +
BALANCE - SITTING DYNAMIC: GOOD
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
## 2019-10-15 ENCOUNTER — OFFICE VISIT (OUTPATIENT)
Dept: MEDICAL GROUP | Facility: PHYSICIAN GROUP | Age: 39
End: 2019-10-15
Payer: MEDICARE

## 2019-10-15 VITALS
DIASTOLIC BLOOD PRESSURE: 70 MMHG | TEMPERATURE: 98 F | SYSTOLIC BLOOD PRESSURE: 110 MMHG | OXYGEN SATURATION: 97 % | WEIGHT: 140 LBS | RESPIRATION RATE: 18 BRPM | HEIGHT: 69 IN | BODY MASS INDEX: 20.73 KG/M2 | HEART RATE: 66 BPM

## 2019-10-15 DIAGNOSIS — Z23 NEED FOR VACCINATION: ICD-10-CM

## 2019-10-15 DIAGNOSIS — G81.90 HEMIPLEGIA AFFECTING DOMINANT SIDE (HCC): ICD-10-CM

## 2019-10-15 PROCEDURE — 99213 OFFICE O/P EST LOW 20 MIN: CPT | Mod: 25 | Performed by: FAMILY MEDICINE

## 2019-10-15 PROCEDURE — 90686 IIV4 VACC NO PRSV 0.5 ML IM: CPT | Performed by: FAMILY MEDICINE

## 2019-10-15 PROCEDURE — G0008 ADMIN INFLUENZA VIRUS VAC: HCPCS | Performed by: FAMILY MEDICINE

## 2019-10-15 NOTE — PROGRESS NOTES
"cc: 's license paperwork      Subjective:     rFitz Ruggiero is a 38 y.o. male presenting for the following:     Driving paperwork-patient does have a history of a stroke causing hemiplegia of his right dominant side.  He has recently been evaluated by Occupational Therapy and they did conclude that he is able to safely drive.  He does have a handle for a car steering wheel arriving today and he is hoping to have the paperwork done for the DMV.     He does take baclofen for muscle spasticity but this is at a low constant dose.  He does not have oversedation with this.  He does not have any issues with seizure or losing consciousness.    Review of systems:  All others reviewed and are negative.       Current Outpatient Medications:   •  baclofen (LIORESAL) 20 MG tablet, Take 1 Tab by mouth 2 times a day., Disp: 180 Tab, Rfl: 1    Allergies, past medical history, past surgical history, family history, social history reviewed and updated    Objective:     Vitals: /70 (BP Location: Left arm, Patient Position: Sitting, BP Cuff Size: Adult)   Pulse 66   Temp 36.7 °C (98 °F) (Temporal)   Resp 18   Ht 1.753 m (5' 9\")   Wt 63.5 kg (140 lb)   SpO2 97%   BMI 20.67 kg/m²   General: Alert, pleasant, NAD  Extremities: No leg edema.    Neurological: sensation grossly intact,  Weakness over right side  Psych:  Affect is normal, judgement is good, memory is intact, grooming is appropriate.    Assessment/Plan:     Fritz was seen today for other.    Diagnoses and all orders for this visit:    Hemiplegia affecting dominant side (HCC): patient recently with evaluation with Occupational Therapy and found to be able to drive.  Paperwork for DMV filled out for patient today.    Need for vaccination  -     Influenza Vaccine Quad Injection (PF)      Return if symptoms worsen or fail to improve.  "

## 2020-03-22 RX ORDER — BACLOFEN 20 MG/1
20 TABLET ORAL 2 TIMES DAILY
Qty: 180 TAB | Refills: 1 | Status: CANCELLED | OUTPATIENT
Start: 2020-03-22

## 2020-03-24 RX ORDER — BACLOFEN 20 MG/1
TABLET ORAL
Qty: 180 TAB | Refills: 1 | Status: SHIPPED | OUTPATIENT
Start: 2020-03-24 | End: 2020-10-23 | Stop reason: SDUPTHER

## 2020-03-24 NOTE — TELEPHONE ENCOUNTER
Was the patient seen in the last year in this department? Yes    Does patient have an active prescription for medications requested? No     Received Request Via: Pharmacy      Pt met protocol?: Yes    LAST OV 10/15/2019

## 2020-10-26 RX ORDER — BACLOFEN 20 MG/1
TABLET ORAL
Qty: 60 TAB | Refills: 0 | Status: SHIPPED | OUTPATIENT
Start: 2020-10-26 | End: 2020-11-30 | Stop reason: SDUPTHER

## 2020-10-26 NOTE — TELEPHONE ENCOUNTER
Last seen by PCP 10/15/2019. Will send 1 month(s) to the pharmacy.  Pt to make appt prior to more refills.

## 2020-11-30 RX ORDER — BACLOFEN 20 MG/1
TABLET ORAL
Qty: 60 TAB | Refills: 0 | Status: SHIPPED | OUTPATIENT
Start: 2020-11-30 | End: 2021-01-07

## 2020-11-30 NOTE — TELEPHONE ENCOUNTER
Please advise patient to establish care with a new PCP. Thanks! Lisa Green P.A.-C. covering for Neema Rodríguez M.D.

## 2021-01-07 RX ORDER — BACLOFEN 20 MG/1
TABLET ORAL
Qty: 60 TAB | Refills: 0 | Status: SHIPPED | OUTPATIENT
Start: 2021-01-07 | End: 2021-01-20 | Stop reason: SDUPTHER

## 2021-01-20 ENCOUNTER — OFFICE VISIT (OUTPATIENT)
Dept: MEDICAL GROUP | Facility: PHYSICIAN GROUP | Age: 41
End: 2021-01-20
Payer: MEDICARE

## 2021-01-20 VITALS
TEMPERATURE: 97.8 F | OXYGEN SATURATION: 96 % | BODY MASS INDEX: 20.61 KG/M2 | WEIGHT: 136 LBS | SYSTOLIC BLOOD PRESSURE: 124 MMHG | DIASTOLIC BLOOD PRESSURE: 80 MMHG | HEART RATE: 104 BPM | HEIGHT: 68 IN

## 2021-01-20 DIAGNOSIS — R25.2 SPASTICITY DUE TO OLD STROKE: ICD-10-CM

## 2021-01-20 DIAGNOSIS — R26.9 ABNORMALITY OF GAIT AS LATE EFFECT OF CEREBROVASCULAR ACCIDENT (CVA): ICD-10-CM

## 2021-01-20 DIAGNOSIS — I69.398 ABNORMALITY OF GAIT AS LATE EFFECT OF CEREBROVASCULAR ACCIDENT (CVA): ICD-10-CM

## 2021-01-20 DIAGNOSIS — I69.392 FACIAL WEAKNESS AS LATE EFFECT OF CEREBROVASCULAR ACCIDENT (CVA): ICD-10-CM

## 2021-01-20 DIAGNOSIS — Z13.1 DIABETES MELLITUS SCREENING: ICD-10-CM

## 2021-01-20 DIAGNOSIS — G81.90 HEMIPLEGIA AFFECTING DOMINANT SIDE (HCC): ICD-10-CM

## 2021-01-20 DIAGNOSIS — I69.398 SPASTICITY DUE TO OLD STROKE: ICD-10-CM

## 2021-01-20 DIAGNOSIS — R73.01 IMPAIRED FASTING GLUCOSE: ICD-10-CM

## 2021-01-20 DIAGNOSIS — Z13.220 LIPID SCREENING: ICD-10-CM

## 2021-01-20 PROCEDURE — 99204 OFFICE O/P NEW MOD 45 MIN: CPT | Performed by: INTERNAL MEDICINE

## 2021-01-20 RX ORDER — BACLOFEN 20 MG/1
20 TABLET ORAL 3 TIMES DAILY PRN
Qty: 180 TAB | Refills: 3 | Status: SHIPPED | OUTPATIENT
Start: 2021-01-20 | End: 2021-09-14

## 2021-01-20 ASSESSMENT — PATIENT HEALTH QUESTIONNAIRE - PHQ9: CLINICAL INTERPRETATION OF PHQ2 SCORE: 0

## 2021-01-20 NOTE — PROGRESS NOTES
New Patient to establish    Chief Complaint   Patient presents with   • Establish Care   • Medication Management       Subjective:     History of Present Illness: Patient is a 40 y.o. male who is here today to establish primary care    1. Hemiplegia affecting dominant side (HCC)  2. Facial weakness as late effect of cerebrovascular accident (CVA)  3. Abnormality of gait as late effect of cerebrovascular accident (CVA)  4. Spasticity due to old stroke  MRI brain 12/2005     IMPRESSION:     1. RESOLUTION OF PREVIOUSLY DEMONSTRATED LARGE LEFT BASAL GANGLIA AND   LEFT FRONTAL HEMORRHAGE.  SMALL RIM OF HEMOSIDERIN REMAINS ALONG THE RIM   OF THE PREVIOUS HEMORRHAGE.     2. SMALL VENOUS ANGIOMA VERSUS OTHER BENIGN VENOUS VASCULAR MALFORMATION   WITHIN THE RIGHT FRONTAL LOBE NOTED INCIDENTALLY.  THIS IS UNCHANGED FROM   2/28/03.       3. 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     No current facility-administered medications on file prior to visit.      Allergies   Allergen Reactions   • Other Environmental Runny Nose and Itching     Pt is allergic pollen and gets itchy eyes and stuffy nose.         Patient Active Problem List    Diagnosis Date Noted   • Spasticity due to old stroke 01/20/2021   • Impaired fasting glucose 01/20/2021   • Dysphagia as late effect of  cerebrovascular accident (CVA) 06/05/2017   • Facial weakness as late effect of cerebrovascular accident (CVA) 06/05/2017   • Abnormality of gait as late effect of cerebrovascular accident (CVA) 06/05/2017   • Use of cane as ambulatory aid 06/05/2017   • Hemiplegia affecting dominant side (HCC) 06/05/2017     Past Medical History:   Diagnosis Date   • Abnormality of gait as late effect of cerebrovascular accident (CVA) 6/5/2017   • Arthralgia 6/29/2009   • Autoimmune disease NEC     Autoimmune disease   • CVA (cerebral vascular accident) (HCC) 02/28/2003   • Dysphagia as late effect of cerebrovascular accident (CVA) 2003   • Facial weakness as late effect of cerebrovascular accident (CVA) 2003   • Neck pain 6/29/2009   • Shoulder pain 6/29/2009   • Spastic hemiplegia and hemiparesis (HCC) 2003   • Use of cane as ambulatory aid 2003     Past Surgical History:   Procedure Laterality Date   • GASTROSTOMY FEEDING  2003   • DENTAL EXTRACTION(S)       Family History   Problem Relation Age of Onset   • Diabetes Mother    • Hypertension Mother    • No Known Problems Father    • Cancer Paternal Grandfather         prostate     Social History     Tobacco Use   • Smoking status: Former Smoker     Packs/day: 0.50     Years: 10.00     Pack years: 5.00     Types: Cigarettes     Quit date: 8/23/2017     Years since quitting: 3.4   • Smokeless tobacco: Never Used   Substance Use Topics   • Alcohol use: No     Alcohol/week: 0.0 oz   • Drug use: Yes     Types: Marijuana       ROS:     - Constitutional: Negative for fever, chills,    - Eye: Negative for blurry vision    -ENT: Negative for ear pain    - Respiratory: Negative for cough, hemoptysis    - Cardiovascular: Negative for chest pain     - Gastrointestinal: Negative for abdominal pain    - Genitourinary: Negative for dysuria    - Musculoskeletal: Negative for joint swelling    - Skin: Negative for itching    - Neurological: Positive for focal weakness     -  "Psychiatric/Behavioral: Negative for depression        Physical Exam:     /80 (BP Location: Left arm, Patient Position: Sitting, BP Cuff Size: Adult)   Pulse (!) 104   Temp 36.6 °C (97.8 °F) (Temporal)   Ht 1.727 m (5' 8\")   Wt 61.7 kg (136 lb)   SpO2 96%   BMI 20.68 kg/m²   General: Normal appearing. No distress.  ENT: oropharynx without exudates.    Eyes: conjunctiva clear lids without ptosis  Pulmonary: Clear to ausculation.  Normal effort.   Cardiovascular: Regular rate and rhythm  Abdomen: Soft, nontender,  Lymph: No cervical or supraclavicular palpable lymph nodes  Psych: Normal mood and affect.   Neurology: Right-sided spasticity of both upper and lower extremity, deep tendon reflexes hyperreflexia on the right side of the body     I have reviewed pertinent labs and diagnostic tests associated with this visit with specific comments listed under the assessment and plan below      Assessment and Plan:     1. Hemiplegia affecting dominant side (HCC)  2. Facial weakness as late effect of cerebrovascular accident (CVA)  3. Abnormality of gait as late effect of cerebrovascular accident (CVA)  4. Spasticity due to old stroke  - Comp Metabolic Panel; Future  - MAGNESIUM; Future  - VITAMIN D,25 HYDROXY; Future  - VIT B12,  FOLIC ACID  - TSH WITH REFLEX TO FT4; Future  - VITAMIN B1; Future  - VITAMIN B6; Future  - baclofen (LIORESAL) 20 MG tablet; Take 1 Tab by mouth 3 times a day as needed.  Dispense: 180 Tab; Refill: 3  - MAGNESIUM; Future  >> Patient will get benefit from vitamin D and magnesium as well for his underlying spasticity as well as sometimes constipation from baclofen    7. Impaired fasting glucose  - HEMOGLOBIN A1C; Future    Follow Up:      Return in about 2 months (around 3/20/2021) for follow up.   Labs, follow-up    Please note that this dictation was created using voice recognition software. I have made every reasonable attempt to correct obvious errors, but I expect that there are " errors of grammar and possibly content that I did not discover before finalizing the note.    Signed by: Carlos Gonzalez M.D.

## 2021-03-08 ENCOUNTER — HOSPITAL ENCOUNTER (OUTPATIENT)
Dept: LAB | Facility: MEDICAL CENTER | Age: 41
End: 2021-03-08
Attending: INTERNAL MEDICINE
Payer: MEDICARE

## 2021-03-08 DIAGNOSIS — R25.2 SPASTICITY DUE TO OLD STROKE: ICD-10-CM

## 2021-03-08 DIAGNOSIS — G81.90 HEMIPLEGIA AFFECTING DOMINANT SIDE (HCC): ICD-10-CM

## 2021-03-08 DIAGNOSIS — I69.398 SPASTICITY DUE TO OLD STROKE: ICD-10-CM

## 2021-03-08 DIAGNOSIS — R73.01 IMPAIRED FASTING GLUCOSE: ICD-10-CM

## 2021-03-08 LAB
25(OH)D3 SERPL-MCNC: 46 NG/ML (ref 30–100)
ALBUMIN SERPL BCP-MCNC: 4.5 G/DL (ref 3.2–4.9)
ALBUMIN/GLOB SERPL: 1.7 G/DL
ALP SERPL-CCNC: 68 U/L (ref 30–99)
ALT SERPL-CCNC: 17 U/L (ref 2–50)
ANION GAP SERPL CALC-SCNC: 12 MMOL/L (ref 7–16)
AST SERPL-CCNC: 23 U/L (ref 12–45)
BILIRUB SERPL-MCNC: 0.9 MG/DL (ref 0.1–1.5)
BUN SERPL-MCNC: 12 MG/DL (ref 8–22)
CALCIUM SERPL-MCNC: 9.9 MG/DL (ref 8.5–10.5)
CHLORIDE SERPL-SCNC: 102 MMOL/L (ref 96–112)
CO2 SERPL-SCNC: 26 MMOL/L (ref 20–33)
CREAT SERPL-MCNC: 0.95 MG/DL (ref 0.5–1.4)
EST. AVERAGE GLUCOSE BLD GHB EST-MCNC: 103 MG/DL
FASTING STATUS PATIENT QL REPORTED: NORMAL
FOLATE SERPL-MCNC: 6.9 NG/ML
GLOBULIN SER CALC-MCNC: 2.6 G/DL (ref 1.9–3.5)
GLUCOSE SERPL-MCNC: 99 MG/DL (ref 65–99)
HBA1C MFR BLD: 5.2 % (ref 4–5.6)
MAGNESIUM SERPL-MCNC: 2.2 MG/DL (ref 1.5–2.5)
POTASSIUM SERPL-SCNC: 4.2 MMOL/L (ref 3.6–5.5)
PROT SERPL-MCNC: 7.1 G/DL (ref 6–8.2)
SODIUM SERPL-SCNC: 140 MMOL/L (ref 135–145)
TSH SERPL DL<=0.005 MIU/L-ACNC: 0.65 UIU/ML (ref 0.38–5.33)
VIT B12 SERPL-MCNC: 345 PG/ML (ref 211–911)

## 2021-03-08 PROCEDURE — 36415 COLL VENOUS BLD VENIPUNCTURE: CPT

## 2021-03-08 PROCEDURE — 80053 COMPREHEN METABOLIC PANEL: CPT

## 2021-03-08 PROCEDURE — 84425 ASSAY OF VITAMIN B-1: CPT

## 2021-03-08 PROCEDURE — 82607 VITAMIN B-12: CPT

## 2021-03-08 PROCEDURE — 83735 ASSAY OF MAGNESIUM: CPT

## 2021-03-08 PROCEDURE — 82746 ASSAY OF FOLIC ACID SERUM: CPT

## 2021-03-08 PROCEDURE — 84207 ASSAY OF VITAMIN B-6: CPT

## 2021-03-08 PROCEDURE — 84443 ASSAY THYROID STIM HORMONE: CPT

## 2021-03-08 PROCEDURE — 82306 VITAMIN D 25 HYDROXY: CPT | Mod: GA

## 2021-03-08 PROCEDURE — 83036 HEMOGLOBIN GLYCOSYLATED A1C: CPT | Mod: GA

## 2021-03-11 LAB — VIT B6 SERPL-MCNC: 25.7 NMOL/L (ref 20–125)

## 2021-03-12 LAB — VIT B1 BLD-MCNC: 112 NMOL/L (ref 70–180)

## 2021-03-25 ENCOUNTER — OFFICE VISIT (OUTPATIENT)
Dept: MEDICAL GROUP | Facility: PHYSICIAN GROUP | Age: 41
End: 2021-03-25
Payer: MEDICARE

## 2021-03-25 VITALS
HEIGHT: 68 IN | HEART RATE: 119 BPM | OXYGEN SATURATION: 98 % | DIASTOLIC BLOOD PRESSURE: 70 MMHG | SYSTOLIC BLOOD PRESSURE: 124 MMHG | BODY MASS INDEX: 20 KG/M2 | TEMPERATURE: 98.2 F | WEIGHT: 132 LBS

## 2021-03-25 DIAGNOSIS — Z02.89 ENCOUNTER FOR COMPLETION OF FORM WITH PATIENT: ICD-10-CM

## 2021-03-25 DIAGNOSIS — T30.0 BURN: ICD-10-CM

## 2021-03-25 PROCEDURE — 99213 OFFICE O/P EST LOW 20 MIN: CPT | Performed by: INTERNAL MEDICINE

## 2021-03-25 NOTE — PATIENT INSTRUCTIONS
Natural vitamins from whole foods (fruit and vegetable)  Vitamin B complex supplement (methylcobalamin B12, methyl folate B9).   Magnesium supplement 400 mg daily  Vitamin D3 supplement 2000 units with K2  Essential oil supplement (cod liver oil)
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
## 2021-09-14 DIAGNOSIS — R25.2 SPASTICITY DUE TO OLD STROKE: ICD-10-CM

## 2021-09-14 DIAGNOSIS — I69.398 SPASTICITY DUE TO OLD STROKE: ICD-10-CM

## 2021-09-14 RX ORDER — BACLOFEN 20 MG/1
TABLET ORAL
Qty: 180 TABLET | Refills: 0 | Status: SHIPPED | OUTPATIENT
Start: 2021-09-14 | End: 2021-11-12

## 2021-10-01 ENCOUNTER — OFFICE VISIT (OUTPATIENT)
Dept: URGENT CARE | Facility: PHYSICIAN GROUP | Age: 41
End: 2021-10-01
Payer: MEDICARE

## 2021-10-01 ENCOUNTER — APPOINTMENT (OUTPATIENT)
Dept: RADIOLOGY | Facility: IMAGING CENTER | Age: 41
End: 2021-10-01
Attending: PHYSICIAN ASSISTANT
Payer: MEDICARE

## 2021-10-01 ENCOUNTER — HOSPITAL ENCOUNTER (OUTPATIENT)
Facility: MEDICAL CENTER | Age: 41
End: 2021-10-01
Attending: PHYSICIAN ASSISTANT
Payer: MEDICARE

## 2021-10-01 ENCOUNTER — HOSPITAL ENCOUNTER (OUTPATIENT)
Dept: RADIOLOGY | Facility: MEDICAL CENTER | Age: 41
End: 2021-10-01
Attending: PHYSICIAN ASSISTANT
Payer: MEDICARE

## 2021-10-01 VITALS
HEIGHT: 69 IN | OXYGEN SATURATION: 98 % | DIASTOLIC BLOOD PRESSURE: 70 MMHG | TEMPERATURE: 98.7 F | BODY MASS INDEX: 19.26 KG/M2 | RESPIRATION RATE: 18 BRPM | SYSTOLIC BLOOD PRESSURE: 120 MMHG | HEART RATE: 78 BPM | WEIGHT: 130 LBS

## 2021-10-01 DIAGNOSIS — S63.635A SPRAIN OF INTERPHALANGEAL JOINT OF LEFT RING FINGER, INITIAL ENCOUNTER: ICD-10-CM

## 2021-10-01 DIAGNOSIS — I86.1 VARICOCELE: ICD-10-CM

## 2021-10-01 DIAGNOSIS — N50.82 PAIN IN SCROTUM: ICD-10-CM

## 2021-10-01 LAB
APPEARANCE UR: CLEAR
BILIRUB UR STRIP-MCNC: NORMAL MG/DL
COLOR UR AUTO: YELLOW
GLUCOSE UR STRIP.AUTO-MCNC: NORMAL MG/DL
KETONES UR STRIP.AUTO-MCNC: NORMAL MG/DL
LEUKOCYTE ESTERASE UR QL STRIP.AUTO: NORMAL
NITRITE UR QL STRIP.AUTO: NORMAL
PH UR STRIP.AUTO: 7 [PH] (ref 5–8)
PROT UR QL STRIP: NORMAL MG/DL
RBC UR QL AUTO: NORMAL
SP GR UR STRIP.AUTO: 1.01
UROBILINOGEN UR STRIP-MCNC: 0.2 MG/DL

## 2021-10-01 PROCEDURE — 99214 OFFICE O/P EST MOD 30 MIN: CPT | Performed by: PHYSICIAN ASSISTANT

## 2021-10-01 PROCEDURE — 73130 X-RAY EXAM OF HAND: CPT | Mod: TC,LT | Performed by: PHYSICIAN ASSISTANT

## 2021-10-01 PROCEDURE — 87491 CHLMYD TRACH DNA AMP PROBE: CPT

## 2021-10-01 PROCEDURE — 81002 URINALYSIS NONAUTO W/O SCOPE: CPT | Performed by: PHYSICIAN ASSISTANT

## 2021-10-01 PROCEDURE — 87591 N.GONORRHOEAE DNA AMP PROB: CPT

## 2021-10-01 PROCEDURE — 76870 US EXAM SCROTUM: CPT

## 2021-10-01 RX ORDER — HYDROCORTISONE CREAM 1% 10 MG/G
CREAM TOPICAL
Qty: 28 G | Refills: 0 | Status: SHIPPED | OUTPATIENT
Start: 2021-10-01 | End: 2021-12-30

## 2021-10-01 RX ORDER — DOXYCYCLINE HYCLATE 100 MG
100 TABLET ORAL 2 TIMES DAILY
Qty: 14 TABLET | Refills: 0 | Status: SHIPPED | OUTPATIENT
Start: 2021-10-01 | End: 2021-10-08

## 2021-10-01 ASSESSMENT — ENCOUNTER SYMPTOMS
FLANK PAIN: 0
FEVER: 0
PAIN: 1
ABDOMINAL PAIN: 0
NAUSEA: 0
CHILLS: 0
VOMITING: 0

## 2021-10-01 NOTE — PROGRESS NOTES
Subjective:   Fritz Ruggiero  is a 40 y.o. male who presents for Pain (patient states that he is here for pain in his penis, started a week ago. he states that it hurts all the time. he is also here for a possible broken finger on the left hand ring finger. )      Pain  This is a new problem. The current episode started in the past 7 days. Pertinent negatives include no abdominal pain, chills, fever, nausea, rash or vomiting.     Patient presents urgent care with 2 complaints.  #1 he complains of a burning sensation on scrotum on the left side.  He notes is in the groin for approximately 1 week.  He denies abnormalities of urine associated.  He denies dysuria frequency urgency or hematuria.  Denies history of urinary tract infection.  Denies testicular swelling.  He denies fevers chills or nausea vomiting.  He denies testicular/genital rash.  Complains of a burning sensation.  Has tried some OTC ibuprofen with some improvement in pain.  He denies concern for STI.  Denies exposures of concern.  Denies history of testicular surgery or injury.  Patient does recount similar episode of discomfort he experienced approximately 1 year ago while on a road trip.  He states he was seen in [facility] and in urgent care and treated with an antibiotic which did resolve the discomfort.  He denies penile discharge.    Additionally patient complains of left hand ring finger pain.  He notes he did jammed this finger in a mild injury around 3 months ago.  He did have some swelling to the joint on the left hand ring finger at that time.  He notes some improvement of pain but not complete resolution.  Patient is left-hand dominant.  He denies history of surgery or significant injury to left hand or fourth digit.  He has tried no treatment specifically for this.    Review of Systems   Constitutional: Negative for chills and fever.   Gastrointestinal: Negative for abdominal pain, nausea and vomiting.   Genitourinary: Positive  "for dysuria ( penile pain w/ urination and otherwise). Negative for flank pain, frequency, hematuria and urgency.   Musculoskeletal: Positive for joint pain ( left 4th digit).   Skin: Negative for rash.       Allergies   Allergen Reactions   • Other Environmental Runny Nose and Itching     Pt is allergic pollen and gets itchy eyes and stuffy nose.            Objective:   /70   Pulse 78   Temp 37.1 °C (98.7 °F) (Temporal)   Resp 18   Ht 1.753 m (5' 9\")   Wt 59 kg (130 lb)   SpO2 98%   BMI 19.20 kg/m²     Physical Exam  Vitals and nursing note reviewed.   Constitutional:       General: He is not in acute distress.     Appearance: He is well-developed. He is not diaphoretic.   HENT:      Head: Normocephalic and atraumatic.      Right Ear: External ear normal.      Left Ear: External ear normal.      Nose: Nose normal.   Eyes:      General: No scleral icterus.        Right eye: No discharge.         Left eye: No discharge.      Conjunctiva/sclera: Conjunctivae normal.   Pulmonary:      Effort: Pulmonary effort is normal. No respiratory distress.   Genitourinary:     Penis: Normal and uncircumcised.       Testes: Cremasteric reflex is present.         Right: Mass, tenderness, swelling, testicular hydrocele or varicocele not present. Right testis is descended. Cremasteric reflex is present.          Left: Tenderness present. Mass, swelling, testicular hydrocele or varicocele not present. Left testis is descended. Cremasteric reflex is present.       Epididymis:      Right: Normal. Not enlarged. No tenderness.      Left: Normal. Not enlarged. No tenderness.      Comments: No visible rash, no ulcerations, slight increased erythema on left scrotum  Musculoskeletal:         General: Normal range of motion.      Left hand: Swelling ( 4th digit PIPJ) and tenderness present. No deformity, lacerations or bony tenderness. Normal range of motion. Normal strength. Normal sensation. There is no disruption of two-point " discrimination. Normal capillary refill. Normal pulse.      Cervical back: Neck supple.      Comments: PIPJ stable varus valgus stress   Skin:     General: Skin is warm and dry.      Coloration: Skin is not pale.   Neurological:      Mental Status: He is alert and oriented to person, place, and time.      Coordination: Coordination normal.       DX hand -   IMPRESSION:     Soft tissue swelling without acute or chronic bony injury identified        Exam Ended: 10/01/21  9:20 AM Last Resulted: 10/01/21  9:27 AM               US scrotum -      IMPRESSION:     Bilateral varicoceles.  No testicular mass identified.        Exam Ended: 10/01/21  3:08 PM Last Resulted: 10/01/21  3:11 PM            POCT UA -   Results for orders placed or performed in visit on 10/01/21   POCT Urinalysis   Result Value Ref Range    POC Color yellow Negative    POC Appearance clear Negative    POC Leukocyte Esterase neg Negative    POC Nitrites neg Negative    POC Urobiligen 0.2 Negative (0.2) mg/dL    POC Protein neg Negative mg/dL    POC Urine PH 7.0 5.0 - 8.0    POC Blood neg Negative    POC Specific Gravity 1.015 <1.005 - >1.030    POC Ketones neg Negative mg/dL    POC Bilirubin neg Negative mg/dL    POC Glucose neg Negative mg/dL     Results reviewed with patient by phone.    Assessment/Plan:   1. Pain in scrotum  - POCT Urinalysis  - [id number]; Future  - CHLAMYDIA/GC PCR URINE OR SWAB; Future  - REFERRAL TO UROLOGY  - doxycycline (VIBRAMYCIN) 100 MG Tab; Take 1 Tablet by mouth 2 times a day for 7 days.  Dispense: 14 Tablet; Refill: 0  - Hydrocortisone Acetate 1 % Cream; Apply to affected area twice daily  Dispense: 28 g; Refill: 0    2. Sprain of interphalangeal joint of left ring finger, initial encounter  - DX-HAND 3+ LEFT; Future    3. Varicocele  - REFERRAL TO UROLOGY    Trial of treatment with hydrocortisone cream, will send referral for urology, sent with course of doxycycline as this did resolve this process in the past,   take full course of Rx, take with probiotics, observe for resolution, follow up with urology    Return to clinic with lack of resolution or progression of symptoms.  ER precautions with any worsening symptoms are reviewed with patient/caregiver and they do express understanding    I have worn an N95 mask, gloves and eye protection for the entire encounter with this patient.     Differential diagnosis, natural history, supportive care, and indications for immediate follow-up discussed.

## 2021-10-02 DIAGNOSIS — N50.82 PAIN IN SCROTUM: ICD-10-CM

## 2021-10-03 LAB
C TRACH DNA SPEC QL NAA+PROBE: NEGATIVE
N GONORRHOEA DNA SPEC QL NAA+PROBE: NEGATIVE
SPECIMEN SOURCE: NORMAL

## 2021-11-12 DIAGNOSIS — I69.398 SPASTICITY DUE TO OLD STROKE: ICD-10-CM

## 2021-11-12 DIAGNOSIS — R25.2 SPASTICITY DUE TO OLD STROKE: ICD-10-CM

## 2021-11-15 RX ORDER — BACLOFEN 20 MG/1
TABLET ORAL
Qty: 180 TABLET | Refills: 0 | Status: SHIPPED | OUTPATIENT
Start: 2021-11-15 | End: 2021-12-30 | Stop reason: SDUPTHER

## 2021-12-07 ENCOUNTER — TELEPHONE (OUTPATIENT)
Dept: HEALTH INFORMATION MANAGEMENT | Facility: OTHER | Age: 41
End: 2021-12-07

## 2021-12-30 ENCOUNTER — OFFICE VISIT (OUTPATIENT)
Dept: MEDICAL GROUP | Facility: PHYSICIAN GROUP | Age: 41
End: 2021-12-30
Payer: MEDICARE

## 2021-12-30 VITALS
TEMPERATURE: 98.6 F | DIASTOLIC BLOOD PRESSURE: 66 MMHG | BODY MASS INDEX: 20.31 KG/M2 | HEIGHT: 68 IN | WEIGHT: 134 LBS | SYSTOLIC BLOOD PRESSURE: 110 MMHG | HEART RATE: 103 BPM | OXYGEN SATURATION: 96 %

## 2021-12-30 DIAGNOSIS — R25.2 SPASTICITY DUE TO OLD STROKE: ICD-10-CM

## 2021-12-30 DIAGNOSIS — G81.90 HEMIPLEGIA AFFECTING DOMINANT SIDE (HCC): ICD-10-CM

## 2021-12-30 DIAGNOSIS — I69.398 SPASTICITY DUE TO OLD STROKE: ICD-10-CM

## 2021-12-30 DIAGNOSIS — N45.3 ORCHITIS AND EPIDIDYMITIS: ICD-10-CM

## 2021-12-30 DIAGNOSIS — N32.81 OVERACTIVE BLADDER: ICD-10-CM

## 2021-12-30 PROCEDURE — 99214 OFFICE O/P EST MOD 30 MIN: CPT | Performed by: INTERNAL MEDICINE

## 2021-12-30 RX ORDER — BACLOFEN 10 MG/1
TABLET ORAL
COMMUNITY
End: 2021-12-30

## 2021-12-30 RX ORDER — MELOXICAM 15 MG/1
15 TABLET ORAL DAILY
COMMUNITY
Start: 2021-12-05 | End: 2021-12-30

## 2021-12-30 RX ORDER — BACLOFEN 20 MG/1
20 TABLET ORAL 3 TIMES DAILY PRN
Qty: 180 TABLET | Refills: 3 | Status: SHIPPED | OUTPATIENT
Start: 2021-12-30 | End: 2022-07-26 | Stop reason: SDUPTHER

## 2021-12-30 RX ORDER — BENZOCAINE/MENTHOL 6 MG-10 MG
LOZENGE MUCOUS MEMBRANE
COMMUNITY
End: 2021-12-30

## 2021-12-30 RX ORDER — MIRABEGRON 25 MG/1
TABLET, FILM COATED, EXTENDED RELEASE ORAL
COMMUNITY
Start: 2021-11-18

## 2021-12-30 RX ORDER — MIRABEGRON 25 MG/1
TABLET, FILM COATED, EXTENDED RELEASE ORAL
COMMUNITY
Start: 2021-12-16 | End: 2021-12-30

## 2021-12-30 RX ORDER — MELOXICAM 15 MG/1
TABLET ORAL
COMMUNITY

## 2021-12-30 RX ORDER — DOXYCYCLINE HYCLATE 100 MG
TABLET ORAL
COMMUNITY
End: 2021-12-30

## 2021-12-30 NOTE — PROGRESS NOTES
"Established Patient    Chief Complaint   Patient presents with   • Medication Refill     baclofen       Subjective:     HPI:   Fritz presents today with the following.    Patient would like to refill his baclofen which he take 20 mg 3 times a day, sometimes 2 times a day, he also take mirabegron 25 mg daily for his overactive bladder      Patient Active Problem List    Diagnosis Date Noted   • Overactive bladder 11/10/2021   • Orchitis and epididymitis 11/10/2021   • Burn 03/25/2021   • Spasticity due to old stroke 01/20/2021   • Impaired fasting glucose 01/20/2021   • Dysphagia as late effect of cerebrovascular accident (CVA) 06/05/2017   • Facial weakness as late effect of cerebrovascular accident (CVA) 06/05/2017   • Abnormality of gait as late effect of cerebrovascular accident (CVA) 06/05/2017   • Use of cane as ambulatory aid 06/05/2017   • Hemiplegia affecting dominant side (HCC) 06/05/2017       Current Outpatient Medications on File Prior to Visit   Medication Sig Dispense Refill   • meloxicam (MOBIC) 15 MG tablet meloxicam 15 mg tablet     • Mirabegron ER (MYRBETRIQ) 25 MG TABLET SR 24 HR Myrbetriq 25 mg tablet,extended release     • Naproxen (NAPROSYN PO) naproxen       No current facility-administered medications on file prior to visit.       Allergies, past medical history, past surgical history, family history, social history reviewed and updated    ROS:  All other systems reviewed and are negative except as stated in the HPI       Physical Exam:     /66 (BP Location: Left arm, Patient Position: Sitting, BP Cuff Size: Adult)   Pulse (!) 103   Temp 37 °C (98.6 °F) (Temporal)   Ht 1.727 m (5' 8\")   Wt 60.8 kg (134 lb)   SpO2 96%   BMI 20.37 kg/m²   General: Normal appearing. No distress.  Pulmonary: Clear to ausculation.  Normal effort.   Cardiovascular: Regular rate and rhythm    Assessment and Plan:     41 y.o. male with the following issues.    1. Spasticity due to old stroke  - baclofen " (LIORESAL) 20 MG tablet; Take 1 Tablet by mouth 3 times a day as needed.  Dispense: 180 Tablet; Refill: 3  2. Hemiplegia affecting dominant side (HCC)  - baclofen (LIORESAL) 20 MG tablet; Take 1 Tablet by mouth 3 times a day as needed.  Dispense: 180 Tablet; Refill: 3  3. Overactive bladder  Continue physical therapy at home, he is otherwise healthy and denied having other symptoms    Follow Up:      Return in about 6 months (around 6/30/2022).  Labs   Please note that this dictation was created using voice recognition software. I have made every reasonable attempt to correct obvious errors, but I expect that there are errors of grammar and possibly content that I did not discover before finalizing the note.    Signed by: Carlos Gonzalez M.D.

## 2022-01-11 ENCOUNTER — PATIENT MESSAGE (OUTPATIENT)
Dept: HEALTH INFORMATION MANAGEMENT | Facility: OTHER | Age: 42
End: 2022-01-11
Payer: MEDICARE

## 2022-03-02 ENCOUNTER — TELEPHONE (OUTPATIENT)
Dept: HEALTH INFORMATION MANAGEMENT | Facility: OTHER | Age: 42
End: 2022-03-02
Payer: MEDICARE

## 2022-05-14 ENCOUNTER — OFFICE VISIT (OUTPATIENT)
Dept: URGENT CARE | Facility: PHYSICIAN GROUP | Age: 42
End: 2022-05-14
Payer: MEDICARE

## 2022-05-14 VITALS
HEART RATE: 94 BPM | WEIGHT: 134 LBS | DIASTOLIC BLOOD PRESSURE: 68 MMHG | SYSTOLIC BLOOD PRESSURE: 114 MMHG | HEIGHT: 69 IN | RESPIRATION RATE: 16 BRPM | OXYGEN SATURATION: 97 % | BODY MASS INDEX: 19.85 KG/M2 | TEMPERATURE: 98.7 F

## 2022-05-14 DIAGNOSIS — Z23 NEED FOR TETANUS BOOSTER: ICD-10-CM

## 2022-05-14 DIAGNOSIS — S01.81XA LACERATION OF FOREHEAD, INITIAL ENCOUNTER: ICD-10-CM

## 2022-05-14 PROCEDURE — 90471 IMMUNIZATION ADMIN: CPT | Performed by: FAMILY MEDICINE

## 2022-05-14 PROCEDURE — 90714 TD VACC NO PRESV 7 YRS+ IM: CPT | Performed by: FAMILY MEDICINE

## 2022-05-14 PROCEDURE — 12011 RPR F/E/E/N/L/M 2.5 CM/<: CPT | Performed by: FAMILY MEDICINE

## 2022-05-14 ASSESSMENT — ENCOUNTER SYMPTOMS
FEVER: 0
DIZZINESS: 0
HEADACHES: 0

## 2022-05-14 NOTE — PROGRESS NOTES
Subjective:     Fritz Ruggiero is a 41 y.o. male who presents for Laceration (Laceration x today upper left part of the face within upper eyebrow small gash , last tdap in 2014 )    HPI  Pt presents for evaluation of an acute problem  Patient with a laceration of the left forehead which just happened this morning  Patient pulling on an elastic dog leash when suddenly the leash, under tension, gave way and whipped back to hit him in the forehead  Able to stop bleeding on his own  Laceration looks pretty deep  No headache  Laceration does not cross into the eyebrow  No injury near the eye     Review of Systems   Constitutional: Negative for fever.   Neurological: Negative for dizziness and headaches.     PMH:  has a past medical history of Abnormality of gait as late effect of cerebrovascular accident (CVA) (6/5/2017), Arthralgia (6/29/2009), Autoimmune disease NEC, CVA (cerebral vascular accident) (HCC) (02/28/2003), Dysphagia as late effect of cerebrovascular accident (CVA) (2003), Facial weakness as late effect of cerebrovascular accident (CVA) (2003), Neck pain (6/29/2009), Shoulder pain (6/29/2009), Spastic hemiplegia and hemiparesis ([facility]) (2003), and Use of cane as ambulatory aid (2003).    He has no past medical history of Diabetes ([facility]), Encounter for long-term (current) use of other medications, or Hypertension.  MEDS:   Current Outpatient Medications:   •  meloxicam (MOBIC) 15 MG tablet, meloxicam 15 mg tablet, Disp: , Rfl:   •  Mirabegron ER (MYRBETRIQ) 25 MG TABLET SR 24 HR, Myrbetriq 25 mg tablet,extended release, Disp: , Rfl:   •  Naproxen (NAPROSYN PO), naproxen, Disp: , Rfl:   •  baclofen (LIORESAL) 20 MG tablet, Take 1 Tablet by mouth 3 times a day as needed., Disp: 180 Tablet, Rfl: 3  ALLERGIES:   Allergies   Allergen Reactions   • Other Environmental Runny Nose and Itching     Pt is allergic pollen and gets itchy eyes and stuffy nose.         SURGHX:   Past Surgical History:   Procedure  "Laterality Date   • GASTROSTOMY FEEDING  2003   • DENTAL EXTRACTION(S)       SOCHX:  reports that he quit smoking about 4 years ago. His smoking use included cigarettes. He has a 5.00 pack-year smoking history. He has never used smokeless tobacco. He reports current drug use. Drug: Marijuana. He reports that he does not drink alcohol.     Objective:   /68 (BP Location: Left arm, Patient Position: Sitting, BP Cuff Size: Adult)   Pulse 94   Temp 37.1 °C (98.7 °F) (Temporal)   Resp 16   Ht 1.753 m (5' 9\")   Wt 60.8 kg (134 lb)   SpO2 97%   BMI 19.79 kg/m²     Physical Exam  Constitutional:       General: He is not in acute distress.     Appearance: He is well-developed. He is not diaphoretic.   Pulmonary:      Effort: Pulmonary effort is normal.   Neurological:      Mental Status: He is alert.     1.5 cm laceration of the left forehead which is full-thickness, subcutaneous fat visible, no foreign body appreciated    Laceration repair  1.5 cm laceration of the left forehead thoroughly irrigated with Hibiclens.  Area was prepped with Betadine and anesthetized with 1% lidocaine without epinephrine.  Using 3 interrupted sutures of 5-0 Prolene, laceration well approximated and hemostatic.   Area dressed with sterile dressing and patient had no acute complications.    Assessment/Plan:   Assessment    1. Laceration of forehead, initial encounter  - TD Preservative Free =>7yo IM    2. Need for tetanus booster  - TD Preservative Free =>7yo IM    Patient with laceration of left forehead.  Repaired with 3 interrupted sutures.  Last tetanus over 5 years ago (2014) and was updated in office today.  Will follow up in 7 to 10 days for suture removal.        "

## 2022-05-22 ENCOUNTER — OFFICE VISIT (OUTPATIENT)
Dept: URGENT CARE | Facility: PHYSICIAN GROUP | Age: 42
End: 2022-05-22
Payer: MEDICARE

## 2022-05-22 VITALS
DIASTOLIC BLOOD PRESSURE: 62 MMHG | HEIGHT: 70 IN | SYSTOLIC BLOOD PRESSURE: 114 MMHG | HEART RATE: 98 BPM | OXYGEN SATURATION: 97 % | TEMPERATURE: 98.9 F | BODY MASS INDEX: 19.18 KG/M2 | RESPIRATION RATE: 18 BRPM | WEIGHT: 134 LBS

## 2022-05-22 DIAGNOSIS — Z48.02 ENCOUNTER FOR REMOVAL OF SUTURES: ICD-10-CM

## 2022-05-22 PROCEDURE — 99212 OFFICE O/P EST SF 10 MIN: CPT | Performed by: STUDENT IN AN ORGANIZED HEALTH CARE EDUCATION/TRAINING PROGRAM

## 2022-05-22 NOTE — PROGRESS NOTES
"Subjective:   Fritz Ruggiero is a 41 y.o. male who presents for Suture / Staple Removal (Forehead,3 stitches,x8 days)      HPI:  Pleasant 41-year-old male presents the clinic for suture removal due to laceration that was repaired on 5/14/2022.  Patient sustained 1.5 cm laceration to left forehead.  Laceration was repaired with 3 simple interrupted sutures.  Patient was advised return in 7 to 10 days for suture removal.  Patient presented on day 8.  Patient denies fever, chills, increasing redness around the laceration, purulence, drainage, swelling, nausea, vomiting, abdominal pain, pain at the laceration site, numbness, tingling, dizziness, or headache.      Medications:    • baclofen  • meloxicam  • Myrbetriq Tb24  • NAPROSYN PO    Allergies: Other environmental    Problem List: Fritz Ruggiero does not have any pertinent problems on file.    Surgical History:  Past Surgical History:   Procedure Laterality Date   • GASTROSTOMY FEEDING  2003   • DENTAL EXTRACTION(S)         Past Social Hx: Fritz Ruggiero  reports that he quit smoking about 4 years ago. His smoking use included cigarettes. He has a 5.00 pack-year smoking history. He has never used smokeless tobacco. He reports current drug use. Drug: Marijuana. He reports that he does not drink alcohol.     Past Family Hx:  Fritz Ruggiero family history includes Cancer in his paternal grandfather; Diabetes in his mother; Hypertension in his mother; No Known Problems in his father.     Problem list, medications, and allergies reviewed by myself today in Epic.     Objective:     /62 (BP Location: Left arm, Patient Position: Sitting, BP Cuff Size: Adult)   Pulse 98   Temp 37.2 °C (98.9 °F) (Temporal)   Resp 18   Ht 1.778 m (5' 10\")   Wt 60.8 kg (134 lb)   SpO2 97%   BMI 19.23 kg/m²     Physical Exam  Vitals reviewed.   Eyes:      Conjunctiva/sclera: Conjunctivae normal.   Cardiovascular:      Rate and Rhythm: Normal rate and " regular rhythm.      Pulses: Normal pulses.      Heart sounds: Normal heart sounds. No murmur heard.  Pulmonary:      Effort: Pulmonary effort is normal.      Breath sounds: Normal breath sounds.   Skin:     General: Skin is warm and dry.      Capillary Refill: Capillary refill takes less than 2 seconds.             Comments: Well-healing 1.5 cm linear laceration to the left forehead.  Fairly mild amount of erythema along the edge of the laceration repair to be consistent with normal healing.  No swelling, induration, purulence, drainage, abscess formation, or signs of skin/deep infection.  Sensation intact.  3 simple interrupted sutures present with good approximation.  3 simple interrupted sutures were removed without complication.  Wound stayed approximated following removal of the sutures.  CMS intact.    Neurological:      General: No focal deficit present.      Mental Status: He is oriented to person, place, and time.         Assessment/Plan:     Diagnosis and associated orders:     1. Encounter for removal of sutures        Comments/MDM:     • Patient presents for suture removal due to laceration repair on his left forehead.  Well-healing wound with good approximation.  No signs of infection.  Sutures removed at this visit without complication.  Wound stayed well approximated following removal.  • Patient was advised on the signs and symptoms of new onset infection.  Patient was advised that he needs to return for any signs of new infection.  Patient has good understanding of this and is agreeable to this.  • Patient was advised to keep the wound covered and clean.  Patient may wash the area with warm water and gentle soap.  Patient should still avoid soaking the area.  Patient should keep the area dry.           Differential diagnosis, natural history, supportive care, and indications for immediate follow-up discussed.    Advised the patient to follow-up with the primary care physician for recheck,  reevaluation, and consideration of further management.    Please note that this dictation was created using voice recognition software. I have made a reasonable attempt to correct obvious errors, but I expect that there are errors of grammar and possibly content that I did not discover before finalizing the note.    Electronically signed by Mervin Hernandez PA-C.

## 2022-06-26 ENCOUNTER — TELEPHONE (OUTPATIENT)
Dept: MEDICAL GROUP | Facility: PHYSICIAN GROUP | Age: 42
End: 2022-06-26
Payer: MEDICARE

## 2022-06-26 NOTE — TELEPHONE ENCOUNTER
ESTABLISHED PATIENT PRE-VISIT PLANNING     Patient was NOT contacted to complete PVP.     Note: Patient will not be contacted if there is no indication to call.     1.  Reviewed notes from the last few office visits within the medical group: Yes    2.  If any orders were placed at last visit or intended to be done for this visit (i.e. 6 mos follow-up), do we have Results/Consult Notes?         •  Labs - Labs were not ordered at last office visit.  Note: If patient appointment is for lab review and patient did not complete labs, check with provider if OK to reschedule patient until labs completed.       •  Imaging - Imaging was not ordered at last office visit.       •  Referrals - No referrals were ordered at last office visit.    3. Is this appointment scheduled as a Hospital Follow-Up? No    4.  Immunizations were updated in Epic using Reconcile Outside Information activity? Yes    5.  Patient is due for the following Health Maintenance Topics:   Health Maintenance Due   Topic Date Due   • Annual Wellness Visit  08/31/2018   • COVID-19 Vaccine (3 - Booster for Moderna series) 09/26/2021       6.  [employer]HA (Pulse8) form printed for Provider? Yes

## 2022-07-26 ENCOUNTER — OFFICE VISIT (OUTPATIENT)
Dept: MEDICAL GROUP | Facility: PHYSICIAN GROUP | Age: 42
End: 2022-07-26
Payer: MEDICARE

## 2022-07-26 VITALS
WEIGHT: 134 LBS | SYSTOLIC BLOOD PRESSURE: 120 MMHG | RESPIRATION RATE: 14 BRPM | HEART RATE: 71 BPM | HEIGHT: 68 IN | TEMPERATURE: 98.6 F | BODY MASS INDEX: 20.31 KG/M2 | DIASTOLIC BLOOD PRESSURE: 66 MMHG | OXYGEN SATURATION: 99 %

## 2022-07-26 DIAGNOSIS — G81.90 HEMIPLEGIA AFFECTING DOMINANT SIDE (HCC): ICD-10-CM

## 2022-07-26 DIAGNOSIS — R25.2 SPASTICITY DUE TO OLD STROKE: ICD-10-CM

## 2022-07-26 DIAGNOSIS — I69.398 SPASTICITY DUE TO OLD STROKE: ICD-10-CM

## 2022-07-26 PROBLEM — T30.0 BURN: Status: RESOLVED | Noted: 2021-03-25 | Resolved: 2022-07-26

## 2022-07-26 PROCEDURE — 99214 OFFICE O/P EST MOD 30 MIN: CPT | Performed by: INTERNAL MEDICINE

## 2022-07-26 RX ORDER — BACLOFEN 20 MG/1
20 TABLET ORAL 3 TIMES DAILY PRN
Qty: 180 TABLET | Refills: 3 | Status: SHIPPED | OUTPATIENT
Start: 2022-07-26 | End: 2022-09-29

## 2022-07-26 ASSESSMENT — PATIENT HEALTH QUESTIONNAIRE - PHQ9: CLINICAL INTERPRETATION OF PHQ2 SCORE: 0

## 2022-07-26 NOTE — PROGRESS NOTES
"Established Patient    Chief Complaint   Patient presents with   • Follow-Up       Subjective:     HPI:   Fritz presents today with the following.    Patient Active Problem List    Diagnosis Date Noted   • Overactive bladder 11/10/2021   • Orchitis and epididymitis 11/10/2021   • Spasticity due to old stroke 01/20/2021   • Impaired fasting glucose 01/20/2021   • Dysphagia as late effect of cerebrovascular accident (CVA) 06/05/2017   • Facial weakness as late effect of cerebrovascular accident (CVA) 06/05/2017   • Abnormality of gait as late effect of cerebrovascular accident (CVA) 06/05/2017   • Use of cane as ambulatory aid 06/05/2017   • Hemiplegia affecting dominant side (HCC) 06/05/2017       Current Outpatient Medications on File Prior to Visit   Medication Sig Dispense Refill   • meloxicam (MOBIC) 15 MG tablet meloxicam 15 mg tablet     • Mirabegron ER (MYRBETRIQ) 25 MG TABLET SR 24 HR Myrbetriq 25 mg tablet,extended release     • Naproxen (NAPROSYN PO) naproxen       No current facility-administered medications on file prior to visit.       Allergies, past medical history, past surgical history, family history, social history reviewed and updated    ROS:  All other systems reviewed and are negative except as stated in the HPI       Physical Exam:     /66 (BP Location: Left arm, Patient Position: Sitting, BP Cuff Size: Adult)   Pulse 71   Temp 37 °C (98.6 °F) (Temporal)   Resp 14   Ht 1.727 m (5' 8\")   Wt 60.8 kg (134 lb)   SpO2 99%   BMI 20.37 kg/m²   General: Normal appearing. No distress.  Pulmonary: Clear to ausculation.  Normal effort.   Cardiovascular: Regular rate and rhythm    Assessment and Plan:     41 y.o. male with the following issues.    1. Spasticity due to old stroke  2. Hemiplegia affecting dominant side (HCC)  Chronic, stable, patient continue requiring baclofen for muscle spasms due to the stroke sequelae, educated in detail regarding conservative management as well including " physical therapy, educated regarding side effect of the medication and complication and avoidance of alcohol and other sedatives  - baclofen (LIORESAL) 20 MG tablet; Take 1 Tablet by mouth 3 times a day as needed (muscle spasm, spasticity due to stroke).  Dispense: 180 Tablet; Refill: 3    Follow Up:      Return in about 6 months (around 1/26/2023).    Please note that this dictation was created using voice recognition software. I have made every reasonable attempt to correct obvious errors, but I expect that there are errors of grammar and possibly content that I did not discover before finalizing the note.    Signed by: Carlos Gonzalez M.D.

## 2022-09-29 DIAGNOSIS — G81.90 HEMIPLEGIA AFFECTING DOMINANT SIDE (HCC): ICD-10-CM

## 2022-09-29 DIAGNOSIS — R25.2 SPASTICITY DUE TO OLD STROKE: ICD-10-CM

## 2022-09-29 DIAGNOSIS — I69.398 SPASTICITY DUE TO OLD STROKE: ICD-10-CM

## 2022-09-29 RX ORDER — BACLOFEN 20 MG/1
TABLET ORAL
Qty: 180 TABLET | Refills: 0 | Status: SHIPPED | OUTPATIENT
Start: 2022-09-29 | End: 2023-06-13 | Stop reason: SDUPTHER

## 2022-11-04 ENCOUNTER — PATIENT MESSAGE (OUTPATIENT)
Dept: HEALTH INFORMATION MANAGEMENT | Facility: OTHER | Age: 42
End: 2022-11-04

## 2023-01-31 ENCOUNTER — OFFICE VISIT (OUTPATIENT)
Dept: MEDICAL GROUP | Facility: PHYSICIAN GROUP | Age: 43
End: 2023-01-31
Payer: MEDICARE

## 2023-01-31 VITALS
DIASTOLIC BLOOD PRESSURE: 76 MMHG | OXYGEN SATURATION: 96 % | SYSTOLIC BLOOD PRESSURE: 124 MMHG | HEIGHT: 68 IN | RESPIRATION RATE: 14 BRPM | TEMPERATURE: 99.2 F | HEART RATE: 82 BPM | BODY MASS INDEX: 20.92 KG/M2 | WEIGHT: 138 LBS

## 2023-01-31 DIAGNOSIS — R25.2 SPASTICITY DUE TO OLD STROKE: ICD-10-CM

## 2023-01-31 DIAGNOSIS — Z02.89 ENCOUNTER FOR COMPLETION OF FORM WITH PATIENT: ICD-10-CM

## 2023-01-31 DIAGNOSIS — G81.90 HEMIPLEGIA AFFECTING DOMINANT SIDE (HCC): ICD-10-CM

## 2023-01-31 DIAGNOSIS — I69.398 SPASTICITY DUE TO OLD STROKE: ICD-10-CM

## 2023-01-31 PROCEDURE — 99212 OFFICE O/P EST SF 10 MIN: CPT | Performed by: INTERNAL MEDICINE

## 2023-01-31 ASSESSMENT — PATIENT HEALTH QUESTIONNAIRE - PHQ9: CLINICAL INTERPRETATION OF PHQ2 SCORE: 0

## 2023-01-31 NOTE — PROGRESS NOTES
"Established Patient    Chief Complaint   Patient presents with    Follow-Up       Subjective:     HPI:   Fritz presents today with the following.    Patient Active Problem List    Diagnosis Date Noted    Overactive bladder 11/10/2021    Orchitis and epididymitis 11/10/2021    Spasticity due to old stroke 01/20/2021    Impaired fasting glucose 01/20/2021    Dysphagia as late effect of cerebrovascular accident (CVA) 06/05/2017    Facial weakness as late effect of cerebrovascular accident (CVA) 06/05/2017    Abnormality of gait as late effect of cerebrovascular accident (CVA) 06/05/2017    Use of cane as ambulatory aid 06/05/2017    Hemiplegia affecting dominant side (HCC) 06/05/2017       Current Outpatient Medications on File Prior to Visit   Medication Sig Dispense Refill    baclofen (LIORESAL) 20 MG tablet Take 1 tablet by mouth three times daily as needed 180 Tablet 0    meloxicam (MOBIC) 15 MG tablet meloxicam 15 mg tablet      Mirabegron ER (MYRBETRIQ) 25 MG TABLET SR 24 HR Myrbetriq 25 mg tablet,extended release      Naproxen (NAPROSYN PO) naproxen       No current facility-administered medications on file prior to visit.       Allergies, past medical history, past surgical history, family history, social history reviewed and updated    ROS:  All other systems reviewed and are negative except as stated in the HPI       Physical Exam:     /76 (BP Location: Left arm, Patient Position: Sitting, BP Cuff Size: Adult)   Pulse 82   Temp 37.3 °C (99.2 °F) (Temporal)   Resp 14   Ht 1.727 m (5' 8\")   Wt 62.6 kg (138 lb)   SpO2 96%   BMI 20.98 kg/m²   General: Normal appearing. No distress.  Pulmonary: Clear to ausculation.  Normal effort.   Cardiovascular: Regular rate and rhythm    Assessment and Plan:     42 y.o. male with the following issues.    1. Hemiplegia affecting dominant side (HCC)    2. Spasticity due to old stroke    3. Encounter for completion of form with patient    Patient is mainly here to " fill out the paperwork for DMV, which is done today, reported his symptoms regarding diplegia and spasticity improving with medication as well as physical therapy and supplement such as vitamin D and magnesium.  Patient otherwise denied having other symptoms, educated regarding continuous physical therapy and conservative management    Follow Up:      Return in about 6 months (around 7/31/2023).    Please note that this dictation was created using voice recognition software. I have made every reasonable attempt to correct obvious errors, but I expect that there are errors of grammar and possibly content that I did not discover before finalizing the note.    Signed by: Carlos Gonzalez M.D.

## 2023-02-05 ENCOUNTER — APPOINTMENT (OUTPATIENT)
Dept: RADIOLOGY | Facility: MEDICAL CENTER | Age: 43
End: 2023-02-05
Attending: EMERGENCY MEDICINE
Payer: MEDICARE

## 2023-02-05 ENCOUNTER — HOSPITAL ENCOUNTER (EMERGENCY)
Facility: MEDICAL CENTER | Age: 43
End: 2023-02-05
Attending: EMERGENCY MEDICINE
Payer: MEDICARE

## 2023-02-05 VITALS
TEMPERATURE: 98.9 F | OXYGEN SATURATION: 98 % | SYSTOLIC BLOOD PRESSURE: 133 MMHG | WEIGHT: 137.57 LBS | HEIGHT: 69 IN | BODY MASS INDEX: 20.38 KG/M2 | RESPIRATION RATE: 18 BRPM | HEART RATE: 61 BPM | DIASTOLIC BLOOD PRESSURE: 79 MMHG

## 2023-02-05 DIAGNOSIS — K85.00 IDIOPATHIC ACUTE PANCREATITIS, UNSPECIFIED COMPLICATION STATUS: ICD-10-CM

## 2023-02-05 DIAGNOSIS — R11.0 NAUSEA: ICD-10-CM

## 2023-02-05 LAB
ALBUMIN SERPL BCP-MCNC: 4.5 G/DL (ref 3.2–4.9)
ALBUMIN/GLOB SERPL: 2.1 G/DL
ALP SERPL-CCNC: 65 U/L (ref 30–99)
ALT SERPL-CCNC: 22 U/L (ref 2–50)
ANION GAP SERPL CALC-SCNC: 13 MMOL/L (ref 7–16)
AST SERPL-CCNC: 22 U/L (ref 12–45)
BASOPHILS # BLD AUTO: 0.2 % (ref 0–1.8)
BASOPHILS # BLD: 0.04 K/UL (ref 0–0.12)
BILIRUB SERPL-MCNC: 1.3 MG/DL (ref 0.1–1.5)
BUN SERPL-MCNC: 12 MG/DL (ref 8–22)
CALCIUM ALBUM COR SERPL-MCNC: 9 MG/DL (ref 8.5–10.5)
CALCIUM SERPL-MCNC: 9.4 MG/DL (ref 8.4–10.2)
CHLORIDE SERPL-SCNC: 102 MMOL/L (ref 96–112)
CO2 SERPL-SCNC: 23 MMOL/L (ref 20–33)
CREAT SERPL-MCNC: 0.79 MG/DL (ref 0.5–1.4)
EOSINOPHIL # BLD AUTO: 0.02 K/UL (ref 0–0.51)
EOSINOPHIL NFR BLD: 0.1 % (ref 0–6.9)
ERYTHROCYTE [DISTWIDTH] IN BLOOD BY AUTOMATED COUNT: 40.5 FL (ref 35.9–50)
GFR SERPLBLD CREATININE-BSD FMLA CKD-EPI: 114 ML/MIN/1.73 M 2
GLOBULIN SER CALC-MCNC: 2.1 G/DL (ref 1.9–3.5)
GLUCOSE SERPL-MCNC: 139 MG/DL (ref 65–99)
HCT VFR BLD AUTO: 46 % (ref 42–52)
HGB BLD-MCNC: 16.2 G/DL (ref 14–18)
IMM GRANULOCYTES # BLD AUTO: 0.07 K/UL (ref 0–0.11)
IMM GRANULOCYTES NFR BLD AUTO: 0.4 % (ref 0–0.9)
LIPASE SERPL-CCNC: 158 U/L (ref 7–58)
LYMPHOCYTES # BLD AUTO: 0.86 K/UL (ref 1–4.8)
LYMPHOCYTES NFR BLD: 5.1 % (ref 22–41)
MCH RBC QN AUTO: 31.3 PG (ref 27–33)
MCHC RBC AUTO-ENTMCNC: 35.2 G/DL (ref 33.7–35.3)
MCV RBC AUTO: 88.8 FL (ref 81.4–97.8)
MONOCYTES # BLD AUTO: 1.83 K/UL (ref 0–0.85)
MONOCYTES NFR BLD AUTO: 10.9 % (ref 0–13.4)
NEUTROPHILS # BLD AUTO: 13.96 K/UL (ref 1.82–7.42)
NEUTROPHILS NFR BLD: 83.3 % (ref 44–72)
NRBC # BLD AUTO: 0 K/UL
NRBC BLD-RTO: 0 /100 WBC
PLATELET # BLD AUTO: 169 K/UL (ref 164–446)
PMV BLD AUTO: 9.7 FL (ref 9–12.9)
POTASSIUM SERPL-SCNC: 3.6 MMOL/L (ref 3.6–5.5)
PROT SERPL-MCNC: 6.6 G/DL (ref 6–8.2)
RBC # BLD AUTO: 5.18 M/UL (ref 4.7–6.1)
SODIUM SERPL-SCNC: 138 MMOL/L (ref 135–145)
WBC # BLD AUTO: 16.8 K/UL (ref 4.8–10.8)

## 2023-02-05 PROCEDURE — 80053 COMPREHEN METABOLIC PANEL: CPT

## 2023-02-05 PROCEDURE — A9270 NON-COVERED ITEM OR SERVICE: HCPCS | Performed by: EMERGENCY MEDICINE

## 2023-02-05 PROCEDURE — 74022 RADEX COMPL AQT ABD SERIES: CPT

## 2023-02-05 PROCEDURE — 99284 EMERGENCY DEPT VISIT MOD MDM: CPT

## 2023-02-05 PROCEDURE — 83690 ASSAY OF LIPASE: CPT

## 2023-02-05 PROCEDURE — 36415 COLL VENOUS BLD VENIPUNCTURE: CPT

## 2023-02-05 PROCEDURE — 700111 HCHG RX REV CODE 636 W/ 250 OVERRIDE (IP): Performed by: EMERGENCY MEDICINE

## 2023-02-05 PROCEDURE — 85025 COMPLETE CBC W/AUTO DIFF WBC: CPT

## 2023-02-05 PROCEDURE — 700102 HCHG RX REV CODE 250 W/ 637 OVERRIDE(OP): Performed by: EMERGENCY MEDICINE

## 2023-02-05 RX ORDER — HYDROCODONE BITARTRATE AND ACETAMINOPHEN 5; 325 MG/1; MG/1
1 TABLET ORAL ONCE
Status: COMPLETED | OUTPATIENT
Start: 2023-02-05 | End: 2023-02-05

## 2023-02-05 RX ORDER — OMEPRAZOLE 20 MG/1
20 CAPSULE, DELAYED RELEASE ORAL DAILY
Qty: 30 CAPSULE | Refills: 0 | Status: SHIPPED | OUTPATIENT
Start: 2023-02-05 | End: 2023-06-13

## 2023-02-05 RX ORDER — ONDANSETRON 4 MG/1
4 TABLET, ORALLY DISINTEGRATING ORAL EVERY 6 HOURS PRN
Qty: 10 TABLET | Refills: 0 | Status: SHIPPED | OUTPATIENT
Start: 2023-02-05 | End: 2023-06-13

## 2023-02-05 RX ORDER — ONDANSETRON 4 MG/1
4 TABLET, ORALLY DISINTEGRATING ORAL ONCE
Status: COMPLETED | OUTPATIENT
Start: 2023-02-05 | End: 2023-02-05

## 2023-02-05 RX ORDER — HYDROCODONE BITARTRATE AND ACETAMINOPHEN 5; 325 MG/1; MG/1
1 TABLET ORAL EVERY 4 HOURS PRN
Qty: 15 TABLET | Refills: 0 | Status: SHIPPED | OUTPATIENT
Start: 2023-02-05 | End: 2023-02-10

## 2023-02-05 RX ADMIN — HYDROCODONE BITARTRATE AND ACETAMINOPHEN 1 TABLET: 5; 325 TABLET ORAL at 20:29

## 2023-02-05 RX ADMIN — LIDOCAINE HYDROCHLORIDE 30 ML: 20 SOLUTION OROPHARYNGEAL at 19:34

## 2023-02-05 RX ADMIN — ONDANSETRON 4 MG: 4 TABLET, ORALLY DISINTEGRATING ORAL at 19:34

## 2023-02-05 ASSESSMENT — PAIN DESCRIPTION - PAIN TYPE: TYPE: ACUTE PAIN

## 2023-02-06 NOTE — ED PROVIDER NOTES
ED Provider Note    CHIEF COMPLAINT  Chief Complaint   Patient presents with    Epigastric Pain     Reports epigastric pain since yesterday, relieved by eating or drinking water, endorses nausea as well, denies vomiting or diarrhea.        EXTERNAL RECORDS REVIEWED  Inpatient Notes reviewed discharge note by Dr. Sanchez note dated March 17, 2003, patient admitted for intraparenchymal and left subcortical intracerebral hemorrhage with right-sided hemiparesis and aphasia.    HPI/ROS  LIMITATION TO HISTORY   Select: : None  OUTSIDE HISTORIAN(S):  Friend at bedside    Fritz Ruggiero is a 42 y.o. male who presents for evaluation of epigastric abdominal discomfort.  Patient notes pain onset 2 days ago, it is sharp and burning in character, localized to the epigastrium without radiation to the back or the right upper/lower quadrant.  Patient endorses associated nausea, no vomiting.  No diarrhea, no dysuria.  Patient relates discomfort seemed somewhat better when he was drinking ice water today.  Given the persistent pain and nausea he came to be assessed.  Of note, he has a history of hemorrhagic CVA with baseline right-sided deficit and formally required a PEG tube placed in the early 2000's.  No longer requiring the tube but patient does have some baseline dysphagia.  Patient notes no significant NSAID use though chart review demonstrates prescriptions for Mobic.  Patient does not drink alcohol.  Former smoker.    PAST MEDICAL HISTORY   has a past medical history of Abnormality of gait as late effect of cerebrovascular accident (CVA) (6/5/2017), Arthralgia (6/29/2009), Autoimmune disease NEC, CVA (cerebral vascular accident) (HCC) (02/28/2003), Dysphagia as late effect of cerebrovascular accident (CVA) (2003), Facial weakness as late effect of cerebrovascular accident (CVA) (2003), Neck pain (6/29/2009), Shoulder pain (6/29/2009), Spastic hemiplegia and hemiparesis (HCC) (2003), and Use of cane as ambulatory aid  "().    SURGICAL HISTORY   has a past surgical history that includes dental extraction(s) and gastrostomy feeding ().    FAMILY HISTORY  Family History   Problem Relation Age of Onset    Diabetes Mother     Hypertension Mother     No Known Problems Father     Cancer Paternal Grandfather         prostate       SOCIAL HISTORY  Social History     Tobacco Use    Smoking status: Former     Packs/day: 0.50     Years: 10.00     Pack years: 5.00     Types: Cigarettes     Quit date: 2017     Years since quittin.4    Smokeless tobacco: Never   Substance and Sexual Activity    Alcohol use: No     Alcohol/week: 0.0 oz    Drug use: Yes     Types: Marijuana    Sexual activity: Yes     Partners: Female       CURRENT MEDICATIONS  Home Medications       Reviewed by Mia Driscoll R.N. (Registered Nurse) on 23 at 1903  Med List Status: Not Addressed     Medication Last Dose Status   baclofen (LIORESAL) 20 MG tablet  Active   meloxicam (MOBIC) 15 MG tablet  Active   Mirabegron ER (MYRBETRIQ) 25 MG TABLET SR 24 HR  Active   Naproxen (NAPROSYN PO)  Active                    ALLERGIES  Allergies   Allergen Reactions    Other Environmental Runny Nose and Itching     Pt is allergic pollen and gets itchy eyes and stuffy nose.           PHYSICAL EXAM  VITAL SIGNS: /83   Pulse 67   Temp 37.2 °C (98.9 °F) (Temporal)   Resp 18   Ht 1.753 m (5' 9\")   Wt 62.4 kg (137 lb 9.1 oz)   SpO2 99%   BMI 20.32 kg/m²    General: Alert, no acute distress  Skin: Warm, dry, normal for ethnicity  Head: Normocephalic, atraumatic  Neck: Trachea midline, no tenderness  Eye: normal conjunctiva, sclera are anicteric  Cardiovascular: Regular rate and rhythm, No murmur, Normal peripheral perfusion  Respiratory: Lungs CTA, respirations are non-labored, breath sounds are equal  Gastrointestinal: Soft, no tenderness to the right upper nor right lower quadrant, no guarding, no rebound, no rigidity.  Bowel sounds are normoactive, " abdomen is nondistended.  Mild tenderness isolated the epigastrium.  Musculoskeletal: No swelling, no deformity  Neurological: Alert and oriented to person, place, time, and situation  Psychiatric: Cooperative, appropriate mood & affect     DIAGNOSTIC STUDIES / PROCEDURES      LABS  Results for orders placed or performed during the hospital encounter of 02/05/23   CBC WITH DIFFERENTIAL   Result Value Ref Range    WBC 16.8 (H) 4.8 - 10.8 K/uL    RBC 5.18 4.70 - 6.10 M/uL    Hemoglobin 16.2 14.0 - 18.0 g/dL    Hematocrit 46.0 42.0 - 52.0 %    MCV 88.8 81.4 - 97.8 fL    MCH 31.3 27.0 - 33.0 pg    MCHC 35.2 33.7 - 35.3 g/dL    RDW 40.5 35.9 - 50.0 fL    Platelet Count 169 164 - 446 K/uL    MPV 9.7 9.0 - 12.9 fL    Neutrophils-Polys 83.30 (H) 44.00 - 72.00 %    Lymphocytes 5.10 (L) 22.00 - 41.00 %    Monocytes 10.90 0.00 - 13.40 %    Eosinophils 0.10 0.00 - 6.90 %    Basophils 0.20 0.00 - 1.80 %    Immature Granulocytes 0.40 0.00 - 0.90 %    Nucleated RBC 0.00 /100 WBC    Neutrophils (Absolute) 13.96 (H) 1.82 - 7.42 K/uL    Lymphs (Absolute) 0.86 (L) 1.00 - 4.80 K/uL    Monos (Absolute) 1.83 (H) 0.00 - 0.85 K/uL    Eos (Absolute) 0.02 0.00 - 0.51 K/uL    Baso (Absolute) 0.04 0.00 - 0.12 K/uL    Immature Granulocytes (abs) 0.07 0.00 - 0.11 K/uL    NRBC (Absolute) 0.00 K/uL   COMP METABOLIC PANEL   Result Value Ref Range    Sodium 138 135 - 145 mmol/L    Potassium 3.6 3.6 - 5.5 mmol/L    Chloride 102 96 - 112 mmol/L    Co2 23 20 - 33 mmol/L    Anion Gap 13.0 7.0 - 16.0    Glucose 139 (H) 65 - 99 mg/dL    Bun 12 8 - 22 mg/dL    Creatinine 0.79 0.50 - 1.40 mg/dL    Calcium 9.4 8.4 - 10.2 mg/dL    AST(SGOT) 22 12 - 45 U/L    ALT(SGPT) 22 2 - 50 U/L    Alkaline Phosphatase 65 30 - 99 U/L    Total Bilirubin 1.3 0.1 - 1.5 mg/dL    Albumin 4.5 3.2 - 4.9 g/dL    Total Protein 6.6 6.0 - 8.2 g/dL    Globulin 2.1 1.9 - 3.5 g/dL    A-G Ratio 2.1 g/dL   LIPASE   Result Value Ref Range    Lipase 158 (H) 7 - 58 U/L   CORRECTED CALCIUM    Result Value Ref Range    Correct Calcium 9.0 8.5 - 10.5 mg/dL   ESTIMATED GFR   Result Value Ref Range    GFR (CKD-EPI) 114 >60 mL/min/1.73 m 2        RADIOLOGY  I have independently interpreted the diagnostic imaging associated with this visit and am waiting the final reading from the radiologist.   My preliminary interpretation is a follows: Nonobstructive bowel gas pattern  Radiologist interpretation:   DX-ABDOMEN COMPLETE WITH AP OR PA CXR   Final Result         1.  No acute cardiopulmonary disease is evident.   2.  Nonspecific bowel gas pattern.           COURSE & MEDICAL DECISION MAKING    ED Observation Status? Yes; I am placing the patient in to an observation status due to a diagnostic uncertainty as well as therapeutic intensity. Patient placed in observation status at 7:26 PM, 2/5/2023.     Observation plan is as follows: Patient medicated with Zofran and a GI cocktail.  Metabolic work-up and x-ray imaging of the abdomen will be obtained.  Differential includes but is not limited to gastritis, peptic ulcer disease, gastroenteritis, pancreatitis    2022: Patient reassessed, nausea improved, pain somewhat improved after GI cocktail.  I have updated him with work-up results.    Upon Reevaluation, the patient's condition has: Improved; and will be discharged.    Patient discharged from ED Observation status at 2037 (Time) 2/5/23 (Date).     INITIAL ASSESSMENT, COURSE AND PLAN  Care Narrative: This patient is a very pleasant 42-year-old gentleman with unfortunate medical history of right sided deficit after hemorrhagic CVA in the early 2000's.  Patient formerly required a PEG tube but no longer requires of this.  No other abdominal surgeries noted.  Chart review demonstrates patient has been written for Mobic, he presents with epigastric discomfort with nausea somewhat improved with drinking cold fluids.  Exam is reassuring, he has no peritoneal signs nor does he have any tenderness at McBurney's point and  he has a negative Connors's sign.   Given no previous diagnosis of this and given he is tender on the exam and has history of previous feeding tube placement x-ray will be obtained to rule out obstructive process.      Studies demonstrate reassuringly no evidence of obstructive process.  He has leukocytosis but no peritoneal signs no emergency febrile nor tachycardic.  This not consistent with sepsis per SIRS criteria.  Patient's lipase is mildly elevated however, this is consistent with acute pancreatitis.  He is not an alcoholic, he has no history of gallbladder stones, not on any medications known to induce pancreatitis, unclear etiology as such.  Given pain is improving and he is not vomiting and is able to tolerate p.o. he is amenable to trial of outpatient management.  I will write him for PPI as well as appropriate analgesia and antiemetics.  Noted if his pain is worsening spite the medications or if he has any difficulty tolerating oral intake he will need to return for reassessment.  Written for a pancreatitis eating plan for him as well.      HTN/IDDM FOLLOW UP:  The patient has known hypertension and is being followed by their primary care doctor      ADDITIONAL PROBLEM LIST  Upper abdominal pain  Nausea  DISPOSITION AND DISCUSSIONS  I have discussed management of the patient with the following physicians and JOCY's:  NA    Discussion of management with other QHP or appropriate source(s): None     Escalation of care considered, and ultimately not performed:IV fluids and diagnostic imaging    Barriers to care at this time, including but not limited to:  NA .     Decision tools and prescription drugs considered including, but not limited to:  Subramanian appendicitis score is 4, low risk stratification .    I reviewed prescription monitoring program for patient's narcotic use before prescribing a scheduled drug.The patient will not drink alcohol nor drive with prescribed medications      In prescribing  controlled substances to this patient, I certify that I have obtained and reviewed the medical history this patient I have also made a good laura effort to obtain applicable records from other providers who have treated the patient and records did not demonstrate any increased risk of substance abuse that would prevent me from prescribing controlled substances.     I have conducted a physical exam and documented it. I have reviewed Mr. Ruggiero’s prescription history as maintained by the [place] Prescription Monitoring Program.     I have assessed the patient’s risk for abuse, dependency, and addiction using the validated Opioid Risk Tool available at https://www.mdcalc.com/bhwszv-evys-biqq-ort-narcotic-abuse.     Given the above, I believe the benefits of controlled substance therapy outweigh the risks. The reasons for prescribing controlled substances include in my professional opinion, controlled substances are a reasonable choice for this patient. Accordingly, I have discussed the risk and benefits, treatment plan, and alternative therapies with the patient. The patient has been consented for the medication and understands the risks.  The patient will return for new or worsening symptoms and is stable at the time of discharge.    Patient has had high blood pressure while in the emergency department, felt likely secondary to medical condition. Counseled patient to monitor blood pressure at home and follow up with primary care physician.      DISPOSITION:  Patient will be discharged home in stable condition.    FOLLOW UP:  Carlos Gonzalez M.D.  [address] [place] [address] [id number] [phone]    Schedule an appointment as soon as possible for a visit         OUTPATIENT MEDICATIONS:  New Prescriptions    HYDROCODONE-ACETAMINOPHEN (NORCO) 5-325 MG TAB PER TABLET    Take 1 Tablet by mouth every four hours as needed (Severe Pain) for up to 5 days.    OMEPRAZOLE (PRILOSEC) 20 MG DELAYED-RELEASE CAPSULE     Take 1 Capsule by mouth every day.    ONDANSETRON (ZOFRAN ODT) 4 MG TABLET DISPERSIBLE    Take 1 Tablet by mouth every 6 hours as needed for Nausea/Vomiting.          FINAL DIAGNOSIS  1. Idiopathic acute pancreatitis, unspecified complication status    2. Nausea           Electronically signed by: Tasneem Mcrae M.D., 2/5/2023 7:05 PM

## 2023-02-16 ENCOUNTER — OFFICE VISIT (OUTPATIENT)
Dept: MEDICAL GROUP | Facility: PHYSICIAN GROUP | Age: 43
End: 2023-02-16
Payer: MEDICARE

## 2023-02-16 VITALS
BODY MASS INDEX: 20.29 KG/M2 | TEMPERATURE: 97.8 F | HEIGHT: 69 IN | RESPIRATION RATE: 14 BRPM | SYSTOLIC BLOOD PRESSURE: 112 MMHG | OXYGEN SATURATION: 97 % | DIASTOLIC BLOOD PRESSURE: 66 MMHG | WEIGHT: 137 LBS | HEART RATE: 78 BPM

## 2023-02-16 DIAGNOSIS — K85.90 ACUTE PANCREATITIS, UNSPECIFIED COMPLICATION STATUS, UNSPECIFIED PANCREATITIS TYPE: ICD-10-CM

## 2023-02-16 PROCEDURE — 99214 OFFICE O/P EST MOD 30 MIN: CPT | Performed by: INTERNAL MEDICINE

## 2023-02-16 ASSESSMENT — FIBROSIS 4 INDEX: FIB4 SCORE: 1.14

## 2023-02-16 NOTE — PROGRESS NOTES
"Established Patient    Chief Complaint   Patient presents with    Hospital Follow-up       Subjective:     HPI:   Fritz presents today with the following.    Patient Active Problem List    Diagnosis Date Noted    Overactive bladder 11/10/2021    Orchitis and epididymitis 11/10/2021    Spasticity due to old stroke 01/20/2021    Impaired fasting glucose 01/20/2021    Dysphagia as late effect of cerebrovascular accident (CVA) 06/05/2017    Facial weakness as late effect of cerebrovascular accident (CVA) 06/05/2017    Abnormality of gait as late effect of cerebrovascular accident (CVA) 06/05/2017    Use of cane as ambulatory aid 06/05/2017    Hemiplegia affecting dominant side (HCC) 06/05/2017       Current Outpatient Medications on File Prior to Visit   Medication Sig Dispense Refill    ondansetron (ZOFRAN ODT) 4 MG TABLET DISPERSIBLE Take 1 Tablet by mouth every 6 hours as needed for Nausea/Vomiting. 10 Tablet 0    omeprazole (PRILOSEC) 20 MG delayed-release capsule Take 1 Capsule by mouth every day. 30 Capsule 0    baclofen (LIORESAL) 20 MG tablet Take 1 tablet by mouth three times daily as needed 180 Tablet 0    meloxicam (MOBIC) 15 MG tablet meloxicam 15 mg tablet      Mirabegron ER (MYRBETRIQ) 25 MG TABLET SR 24 HR Myrbetriq 25 mg tablet,extended release      Naproxen (NAPROSYN PO) naproxen       No current facility-administered medications on file prior to visit.       Allergies, past medical history, past surgical history, family history, social history reviewed and updated    ROS:  All other systems reviewed and are negative except as stated in the HPI       Physical Exam:     /66 (BP Location: Right arm, Patient Position: Sitting, BP Cuff Size: Adult)   Pulse 78   Temp 36.6 °C (97.8 °F) (Temporal)   Resp 14   Ht 1.753 m (5' 9\")   Wt 62.1 kg (137 lb)   SpO2 97%   BMI 20.23 kg/m²   General: Normal appearing. No distress.  Pulmonary: Clear to ausculation.  Normal effort.   Cardiovascular: Regular " rate and rhythm    Assessment and Plan:     42 y.o. male with the following issues.    1. Acute pancreatitis, unspecified complication status, unspecified pancreatitis type  Recently admitted to the ER for above, mention he had pain for 1 day, in the epigastric area, no radiation to the back, associated with some nausea, patient denied having constipation or diarrhea, no vomiting, no apparent GI bleeding or melena, no other alarm GI symptoms, lab with elevation of lipase,-have not performed ultrasound of the abdomen, patient denied having alcohol or other substance abuse, he is not correlating with greasy food as well, denied having urinary symptoms  - On abdominal exam today is benign, soft, no tenderness, no rebound tenderness or guarding  -Educated in detail regarding conservative management including well hydration, avoiding NSAIDs as much as possible to avoid GI complication from that as well    - US-ABDOMEN COMPLETE SURVEY; Future    Please note that this dictation was created using voice recognition software. I have made every reasonable attempt to correct obvious errors, but I expect that there are errors of grammar and possibly content that I did not discover before finalizing the note.    Signed by: Carlos Gonzalez M.D.

## 2023-03-05 ENCOUNTER — HOSPITAL ENCOUNTER (OUTPATIENT)
Dept: RADIOLOGY | Facility: MEDICAL CENTER | Age: 43
End: 2023-03-05
Attending: INTERNAL MEDICINE
Payer: MEDICARE

## 2023-03-05 DIAGNOSIS — K85.90 ACUTE PANCREATITIS, UNSPECIFIED COMPLICATION STATUS, UNSPECIFIED PANCREATITIS TYPE: ICD-10-CM

## 2023-03-05 PROCEDURE — 76700 US EXAM ABDOM COMPLETE: CPT

## 2023-03-06 PROBLEM — K82.4 GALLBLADDER POLYP: Status: ACTIVE | Noted: 2023-03-06

## 2023-06-09 ENCOUNTER — DOCUMENTATION (OUTPATIENT)
Dept: HEALTH INFORMATION MANAGEMENT | Facility: OTHER | Age: 43
End: 2023-06-09
Payer: MEDICARE

## 2023-06-09 SDOH — HEALTH STABILITY: PHYSICAL HEALTH: ON AVERAGE, HOW MANY MINUTES DO YOU ENGAGE IN EXERCISE AT THIS LEVEL?: 70 MIN

## 2023-06-09 SDOH — ECONOMIC STABILITY: HOUSING INSECURITY
IN THE LAST 12 MONTHS, WAS THERE A TIME WHEN YOU DID NOT HAVE A STEADY PLACE TO SLEEP OR SLEPT IN A SHELTER (INCLUDING NOW)?: NO

## 2023-06-09 SDOH — ECONOMIC STABILITY: INCOME INSECURITY: HOW HARD IS IT FOR YOU TO PAY FOR THE VERY BASICS LIKE FOOD, HOUSING, MEDICAL CARE, AND HEATING?: NOT VERY HARD

## 2023-06-09 SDOH — ECONOMIC STABILITY: TRANSPORTATION INSECURITY
IN THE PAST 12 MONTHS, HAS THE LACK OF TRANSPORTATION KEPT YOU FROM MEDICAL APPOINTMENTS OR FROM GETTING MEDICATIONS?: NO

## 2023-06-09 SDOH — ECONOMIC STABILITY: FOOD INSECURITY: WITHIN THE PAST 12 MONTHS, THE FOOD YOU BOUGHT JUST DIDN'T LAST AND YOU DIDN'T HAVE MONEY TO GET MORE.: NEVER TRUE

## 2023-06-09 SDOH — ECONOMIC STABILITY: TRANSPORTATION INSECURITY
IN THE PAST 12 MONTHS, HAS LACK OF RELIABLE TRANSPORTATION KEPT YOU FROM MEDICAL APPOINTMENTS, MEETINGS, WORK OR FROM GETTING THINGS NEEDED FOR DAILY LIVING?: NO

## 2023-06-09 SDOH — ECONOMIC STABILITY: INCOME INSECURITY: IN THE LAST 12 MONTHS, WAS THERE A TIME WHEN YOU WERE NOT ABLE TO PAY THE MORTGAGE OR RENT ON TIME?: NO

## 2023-06-09 SDOH — ECONOMIC STABILITY: TRANSPORTATION INSECURITY
IN THE PAST 12 MONTHS, HAS LACK OF TRANSPORTATION KEPT YOU FROM MEETINGS, WORK, OR FROM GETTING THINGS NEEDED FOR DAILY LIVING?: NO

## 2023-06-09 SDOH — ECONOMIC STABILITY: FOOD INSECURITY: WITHIN THE PAST 12 MONTHS, YOU WORRIED THAT YOUR FOOD WOULD RUN OUT BEFORE YOU GOT MONEY TO BUY MORE.: NEVER TRUE

## 2023-06-09 SDOH — HEALTH STABILITY: PHYSICAL HEALTH: ON AVERAGE, HOW MANY DAYS PER WEEK DO YOU ENGAGE IN MODERATE TO STRENUOUS EXERCISE (LIKE A BRISK WALK)?: 6 DAYS

## 2023-06-09 SDOH — ECONOMIC STABILITY: HOUSING INSECURITY

## 2023-06-09 SDOH — HEALTH STABILITY: MENTAL HEALTH
STRESS IS WHEN SOMEONE FEELS TENSE, NERVOUS, ANXIOUS, OR CAN'T SLEEP AT NIGHT BECAUSE THEIR MIND IS TROUBLED. HOW STRESSED ARE YOU?: ONLY A LITTLE

## 2023-06-09 ASSESSMENT — SOCIAL DETERMINANTS OF HEALTH (SDOH)
ARE YOU MARRIED, WIDOWED, DIVORCED, SEPARATED, NEVER MARRIED, OR LIVING WITH A PARTNER?: NEVER MARRIED
IN A TYPICAL WEEK, HOW MANY TIMES DO YOU TALK ON THE PHONE WITH FAMILY, FRIENDS, OR NEIGHBORS?: ONCE A WEEK
HOW OFTEN DO YOU HAVE A DRINK CONTAINING ALCOHOL: NEVER
HOW MANY DRINKS CONTAINING ALCOHOL DO YOU HAVE ON A TYPICAL DAY WHEN YOU ARE DRINKING: PATIENT DOES NOT DRINK
HOW OFTEN DO YOU ATTEND CHURCH OR RELIGIOUS SERVICES?: NEVER
DO YOU BELONG TO ANY CLUBS OR ORGANIZATIONS SUCH AS CHURCH GROUPS UNIONS, FRATERNAL OR ATHLETIC GROUPS, OR SCHOOL GROUPS?: YES
HOW OFTEN DO YOU ATTENT MEETINGS OF THE CLUB OR ORGANIZATION YOU BELONG TO?: 1 TO 4 TIMES PER YEAR
IN A TYPICAL WEEK, HOW MANY TIMES DO YOU TALK ON THE PHONE WITH FAMILY, FRIENDS, OR NEIGHBORS?: ONCE A WEEK
ARE YOU MARRIED, WIDOWED, DIVORCED, SEPARATED, NEVER MARRIED, OR LIVING WITH A PARTNER?: NEVER MARRIED
WITHIN THE PAST 12 MONTHS, YOU WORRIED THAT YOUR FOOD WOULD RUN OUT BEFORE YOU GOT THE MONEY TO BUY MORE: NEVER TRUE
HOW OFTEN DO YOU ATTENT MEETINGS OF THE CLUB OR ORGANIZATION YOU BELONG TO?: 1 TO 4 TIMES PER YEAR
HOW OFTEN DO YOU GET TOGETHER WITH FRIENDS OR RELATIVES?: MORE THAN THREE TIMES A WEEK
DO YOU BELONG TO ANY CLUBS OR ORGANIZATIONS SUCH AS CHURCH GROUPS UNIONS, FRATERNAL OR ATHLETIC GROUPS, OR SCHOOL GROUPS?: YES
HOW OFTEN DO YOU ATTEND CHURCH OR RELIGIOUS SERVICES?: NEVER
HOW OFTEN DO YOU HAVE SIX OR MORE DRINKS ON ONE OCCASION: NEVER
HOW HARD IS IT FOR YOU TO PAY FOR THE VERY BASICS LIKE FOOD, HOUSING, MEDICAL CARE, AND HEATING?: NOT VERY HARD
HOW OFTEN DO YOU GET TOGETHER WITH FRIENDS OR RELATIVES?: MORE THAN THREE TIMES A WEEK

## 2023-06-09 ASSESSMENT — LIFESTYLE VARIABLES
HOW OFTEN DO YOU HAVE A DRINK CONTAINING ALCOHOL: NEVER
HOW MANY STANDARD DRINKS CONTAINING ALCOHOL DO YOU HAVE ON A TYPICAL DAY: PATIENT DOES NOT DRINK
AUDIT-C TOTAL SCORE: 0
SKIP TO QUESTIONS 9-10: 1
HOW OFTEN DO YOU HAVE SIX OR MORE DRINKS ON ONE OCCASION: NEVER

## 2023-06-13 ENCOUNTER — OFFICE VISIT (OUTPATIENT)
Dept: MEDICAL GROUP | Facility: PHYSICIAN GROUP | Age: 43
End: 2023-06-13
Payer: MEDICARE

## 2023-06-13 VITALS
OXYGEN SATURATION: 95 % | HEART RATE: 94 BPM | WEIGHT: 131 LBS | HEIGHT: 69 IN | SYSTOLIC BLOOD PRESSURE: 108 MMHG | BODY MASS INDEX: 19.4 KG/M2 | TEMPERATURE: 97.6 F | RESPIRATION RATE: 16 BRPM | DIASTOLIC BLOOD PRESSURE: 64 MMHG

## 2023-06-13 DIAGNOSIS — N32.81 OVERACTIVE BLADDER: ICD-10-CM

## 2023-06-13 DIAGNOSIS — I69.398 ABNORMALITY OF GAIT AS LATE EFFECT OF CEREBROVASCULAR ACCIDENT (CVA): ICD-10-CM

## 2023-06-13 DIAGNOSIS — Z11.59 NEED FOR HEPATITIS C SCREENING TEST: ICD-10-CM

## 2023-06-13 DIAGNOSIS — Z00.00 PREVENTATIVE HEALTH CARE: ICD-10-CM

## 2023-06-13 DIAGNOSIS — E78.00 HYPERCHOLESTEROLEMIA: ICD-10-CM

## 2023-06-13 DIAGNOSIS — I69.392 FACIAL WEAKNESS AS LATE EFFECT OF CEREBROVASCULAR ACCIDENT (CVA): ICD-10-CM

## 2023-06-13 DIAGNOSIS — R26.9 ABNORMALITY OF GAIT AS LATE EFFECT OF CEREBROVASCULAR ACCIDENT (CVA): ICD-10-CM

## 2023-06-13 DIAGNOSIS — I69.398 SPASTICITY DUE TO OLD STROKE: ICD-10-CM

## 2023-06-13 DIAGNOSIS — G81.90 HEMIPLEGIA AFFECTING DOMINANT SIDE (HCC): ICD-10-CM

## 2023-06-13 DIAGNOSIS — R25.2 SPASTICITY DUE TO OLD STROKE: ICD-10-CM

## 2023-06-13 DIAGNOSIS — I69.391 DYSPHAGIA AS LATE EFFECT OF CEREBROVASCULAR ACCIDENT (CVA): ICD-10-CM

## 2023-06-13 PROBLEM — N45.3 ORCHITIS AND EPIDIDYMITIS: Status: RESOLVED | Noted: 2021-11-10 | Resolved: 2023-06-13

## 2023-06-13 PROBLEM — R73.01 IMPAIRED FASTING GLUCOSE: Status: RESOLVED | Noted: 2021-01-20 | Resolved: 2023-06-13

## 2023-06-13 PROCEDURE — 3078F DIAST BP <80 MM HG: CPT | Performed by: FAMILY MEDICINE

## 2023-06-13 PROCEDURE — 99214 OFFICE O/P EST MOD 30 MIN: CPT | Performed by: FAMILY MEDICINE

## 2023-06-13 PROCEDURE — 3074F SYST BP LT 130 MM HG: CPT | Performed by: FAMILY MEDICINE

## 2023-06-13 RX ORDER — BACLOFEN 20 MG/1
20 TABLET ORAL 3 TIMES DAILY PRN
Qty: 270 TABLET | Refills: 3 | Status: SHIPPED | OUTPATIENT
Start: 2023-06-13

## 2023-06-13 ASSESSMENT — FIBROSIS 4 INDEX: FIB4 SCORE: 1.14

## 2023-06-13 NOTE — PROGRESS NOTES
"CHIEF COMPLAINT / REASON FOR VISIT  Fritz Ruggiero is a 42 y.o. male that presents today to [facility] care.    HISTORY OF PRESENT ILLNESS  History of a stroke at age 22 causing spastic hemiplegia of his right dominant side, arm worse than leg.  Reportedly was secondary to intracranial hemorrhage from amphetamine use.  Uses baclofen for spasticity. Uses mirabegron ER 25 mg daily for overactive bladder, follows with urology. Also had hx of dysphagia and aphagia though this improved.     Maternal and paternal grandfathers - heart attacks  Mom - DM    Social History     Tobacco Use    Smoking status: Former     Packs/day: 0.50     Years: 10.00     Pack years: 5.00     Types: Cigarettes     Quit date: 2017     Years since quittin.8    Smokeless tobacco: Never   Substance Use Topics    Alcohol use: No     Alcohol/week: 0.0 oz    Drug use: Yes     Types: Marijuana     OBJECTIVE    /64 (BP Location: Left arm, Patient Position: Sitting, BP Cuff Size: Adult)   Pulse 94   Temp 36.4 °C (97.6 °F) (Temporal)   Resp 16   Ht 1.753 m (5' 9\")   Wt 59.4 kg (131 lb)   SpO2 95%   BMI 19.35 kg/m²      PHYSICAL EXAM  Constitutional: Sitting comfortably, in no acute distress, responds to questions appropriately.  Head: Normocephalic  Eyes:  No conjunctival injection, no scleral icterus, PERRL  Ears: External ear canals clear, TMs pearly grey with visualized bony landmarks and crisp light reflex  Mouth: Oral mucosa moist  Throat: Oropharynx clear without erythema or tonsillar exudates  Neck: No cervical lymphadenopathy  Heart: Regular S1 S2, no murmurs, rub, or gallops  Lungs: Clear to auscultation bilaterally, no wheezes, rales, or rhonchi  Extremities: No lower extremity edema. 2+ symmetric radial pulses  Skin: Warm and dry, no rashes or lesions on face or exposed upper extremities  Right upper and lower extremity spastic paraplegia     ASSESSMENT & PLAN  1. Spasticity due to old stroke  2. Hemiplegia " affecting dominant side (HCC)  3. Abnormality of gait as late effect of cerebrovascular accident (CVA)  4. Dysphagia as late effect of cerebrovascular accident (CVA)  5. Facial weakness as late effect of cerebrovascular accident (CVA)  History of a stroke due to intracranial hemorrhage at age 22, causing spastic hemiplegia of his right dominant side, arm worse than leg.  The hospital course reports that his UDS tested positive for amphetamines, so it was thought that this might have contributed. Uses baclofen 20 mg TID PRN for spasticity. Uses mirabegron ER 25 mg daily for overactive bladder, follows with urology. Also had hx of dysphagia and aphagia though this improved.   - baclofen (LIORESAL) 20 MG tablet; Take 1 Tablet by mouth 3 times a day as needed (spasticity).  Dispense: 270 Tablet; Refill: 3    6. Overactive bladder  Follows with urology, stable on mirabegron ER 25 mg daily    7. Hypercholesterolemia  - Lipid Profile; Future    8. Need for hepatitis C screening test  - HEP C VIRUS ANTIBODY; Future    9. Preventative health care  - Comp Metabolic Panel; Future  - Lipid Profile; Future  - HEMOGLOBIN A1C; Future    We will inform patient of labs via [facility] unless abnormal

## 2023-07-07 ENCOUNTER — TELEPHONE (OUTPATIENT)
Dept: HEALTH INFORMATION MANAGEMENT | Facility: OTHER | Age: 43
End: 2023-07-07
Payer: MEDICARE

## 2024-01-26 ENCOUNTER — HOSPITAL ENCOUNTER (OUTPATIENT)
Dept: LAB | Facility: MEDICAL CENTER | Age: 44
End: 2024-01-26
Attending: FAMILY MEDICINE
Payer: MEDICARE

## 2024-01-26 DIAGNOSIS — Z11.59 NEED FOR HEPATITIS C SCREENING TEST: ICD-10-CM

## 2024-01-26 DIAGNOSIS — E78.00 HYPERCHOLESTEROLEMIA: ICD-10-CM

## 2024-01-26 DIAGNOSIS — Z00.00 PREVENTATIVE HEALTH CARE: ICD-10-CM

## 2024-01-26 LAB
ALBUMIN SERPL BCP-MCNC: 4.6 G/DL (ref 3.2–4.9)
ALBUMIN/GLOB SERPL: 1.8 G/DL
ALP SERPL-CCNC: 65 U/L (ref 30–99)
ALT SERPL-CCNC: 15 U/L (ref 2–50)
ANION GAP SERPL CALC-SCNC: 13 MMOL/L (ref 7–16)
AST SERPL-CCNC: 27 U/L (ref 12–45)
BILIRUB SERPL-MCNC: 1.2 MG/DL (ref 0.1–1.5)
BUN SERPL-MCNC: 17 MG/DL (ref 8–22)
CALCIUM ALBUM COR SERPL-MCNC: 8.7 MG/DL (ref 8.5–10.5)
CALCIUM SERPL-MCNC: 9.2 MG/DL (ref 8.5–10.5)
CHLORIDE SERPL-SCNC: 105 MMOL/L (ref 96–112)
CHOLEST SERPL-MCNC: 175 MG/DL (ref 100–199)
CO2 SERPL-SCNC: 26 MMOL/L (ref 20–33)
CREAT SERPL-MCNC: 0.91 MG/DL (ref 0.5–1.4)
EST. AVERAGE GLUCOSE BLD GHB EST-MCNC: 100 MG/DL
GFR SERPLBLD CREATININE-BSD FMLA CKD-EPI: 108 ML/MIN/1.73 M 2
GLOBULIN SER CALC-MCNC: 2.6 G/DL (ref 1.9–3.5)
GLUCOSE SERPL-MCNC: 89 MG/DL (ref 65–99)
HBA1C MFR BLD: 5.1 % (ref 4–5.6)
HCV AB SER QL: NORMAL
HDLC SERPL-MCNC: 58 MG/DL
LDLC SERPL CALC-MCNC: 100 MG/DL
POTASSIUM SERPL-SCNC: 4 MMOL/L (ref 3.6–5.5)
PROT SERPL-MCNC: 7.2 G/DL (ref 6–8.2)
SODIUM SERPL-SCNC: 144 MMOL/L (ref 135–145)
TRIGL SERPL-MCNC: 86 MG/DL (ref 0–149)

## 2024-01-26 PROCEDURE — 83036 HEMOGLOBIN GLYCOSYLATED A1C: CPT | Mod: GA

## 2024-01-26 PROCEDURE — 36415 COLL VENOUS BLD VENIPUNCTURE: CPT

## 2024-01-26 PROCEDURE — 80053 COMPREHEN METABOLIC PANEL: CPT

## 2024-01-26 PROCEDURE — 86803 HEPATITIS C AB TEST: CPT

## 2024-01-26 PROCEDURE — 80061 LIPID PANEL: CPT

## 2024-01-30 ENCOUNTER — OFFICE VISIT (OUTPATIENT)
Dept: MEDICAL GROUP | Facility: PHYSICIAN GROUP | Age: 44
End: 2024-01-30
Payer: MEDICARE

## 2024-01-30 VITALS
BODY MASS INDEX: 19.85 KG/M2 | HEART RATE: 86 BPM | RESPIRATION RATE: 14 BRPM | WEIGHT: 134 LBS | TEMPERATURE: 97.3 F | DIASTOLIC BLOOD PRESSURE: 68 MMHG | OXYGEN SATURATION: 99 % | HEIGHT: 69 IN | SYSTOLIC BLOOD PRESSURE: 110 MMHG

## 2024-01-30 DIAGNOSIS — M77.12 LEFT LATERAL EPICONDYLITIS: ICD-10-CM

## 2024-01-30 PROCEDURE — 3078F DIAST BP <80 MM HG: CPT | Performed by: FAMILY MEDICINE

## 2024-01-30 PROCEDURE — 99213 OFFICE O/P EST LOW 20 MIN: CPT | Performed by: FAMILY MEDICINE

## 2024-01-30 PROCEDURE — 3074F SYST BP LT 130 MM HG: CPT | Performed by: FAMILY MEDICINE

## 2024-01-30 ASSESSMENT — FIBROSIS 4 INDEX: FIB4 SCORE: 1.73

## 2024-01-30 NOTE — PROGRESS NOTES
"CHIEF COMPLAINT / REASON FOR VISIT  Fritz Ruggiero is a 43 y.o. male that presents today for   Chief Complaint   Patient presents with    Elbow Pain     L side     Lab Results     HISTORY OF PRESENT ILLNESS  Left elbow pain  Approximately 10 months ago, was washing vehicles, and afterwards started to hurt.   Aggravated by everyday movement, even sometimes bothers him at night while sleeping. No weakness, numbness, or paresthesias     OBJECTIVE     /68 (BP Location: Left arm, Patient Position: Sitting, BP Cuff Size: Adult)   Pulse 86   Temp 36.3 °C (97.3 °F) (Temporal)   Resp 14   Ht 1.753 m (5' 9\")   Wt 60.8 kg (134 lb)   SpO2 99%   BMI 19.79 kg/m²      PHYSICAL EXAM  Constitutional: Sitting comfortably, in no acute distress, responds to questions appropriately.  Left elbow: Mild tenderness of left lateral epicondyle, no pain with resisted wrist extension or passive wrist flexion  Skin: Warm and dry, no rashes or lesions on face or exposed upper extremities    ASSESSMENT & PLAN  1. Left lateral epicondylitis  Chronic, uncontrolled, recommend avoiding aggravating activities and using counterforce brace.  If not improving would refer to sports medicine.    HCC Gap Form    Diagnosis to address: G81.90 - Hemiplegia affecting dominant side (HCC)  Assessment and plan: Chronic, stable.  Secondary to stroke at age 22.  Continue with current defined treatment plan: Follow-up at least annually.  Last edited 01/30/24 08:30 PST by Gregory Dickerson M.D.         "

## 2024-04-03 ENCOUNTER — TELEPHONE (OUTPATIENT)
Dept: HEALTH INFORMATION MANAGEMENT | Facility: OTHER | Age: 44
End: 2024-04-03
Payer: MEDICARE

## 2024-07-31 ENCOUNTER — TELEPHONE (OUTPATIENT)
Dept: HEALTH INFORMATION MANAGEMENT | Facility: OTHER | Age: 44
End: 2024-07-31

## 2024-08-29 ENCOUNTER — DOCUMENTATION (OUTPATIENT)
Dept: HEALTH INFORMATION MANAGEMENT | Facility: OTHER | Age: 44
End: 2024-08-29

## 2024-08-29 ENCOUNTER — OFFICE VISIT (OUTPATIENT)
Dept: MEDICAL GROUP | Facility: PHYSICIAN GROUP | Age: 44
End: 2024-08-29
Payer: MEDICARE

## 2024-08-29 VITALS
HEART RATE: 99 BPM | DIASTOLIC BLOOD PRESSURE: 58 MMHG | BODY MASS INDEX: 21.13 KG/M2 | HEIGHT: 68 IN | OXYGEN SATURATION: 98 % | SYSTOLIC BLOOD PRESSURE: 122 MMHG | WEIGHT: 139.4 LBS

## 2024-08-29 DIAGNOSIS — I61.9 BRAIN BLEED (HCC): ICD-10-CM

## 2024-08-29 DIAGNOSIS — E78.00 HYPERCHOLESTEROLEMIA: ICD-10-CM

## 2024-08-29 DIAGNOSIS — R25.2 SPASTICITY DUE TO OLD STROKE: ICD-10-CM

## 2024-08-29 DIAGNOSIS — N32.81 OVERACTIVE BLADDER: ICD-10-CM

## 2024-08-29 DIAGNOSIS — Z00.00 PREVENTATIVE HEALTH CARE: ICD-10-CM

## 2024-08-29 DIAGNOSIS — I69.398 SPASTICITY DUE TO OLD STROKE: ICD-10-CM

## 2024-08-29 DIAGNOSIS — Z23 NEED FOR HPV VACCINE: ICD-10-CM

## 2024-08-29 DIAGNOSIS — G81.90 HEMIPLEGIA AFFECTING DOMINANT SIDE (HCC): ICD-10-CM

## 2024-08-29 RX ORDER — BACLOFEN 20 MG/1
20 TABLET ORAL 3 TIMES DAILY PRN
Qty: 270 TABLET | Refills: 3 | Status: SHIPPED | OUTPATIENT
Start: 2024-08-29

## 2024-08-29 SDOH — HEALTH STABILITY: MENTAL HEALTH
STRESS IS WHEN SOMEONE FEELS TENSE, NERVOUS, ANXIOUS, OR CAN'T SLEEP AT NIGHT BECAUSE THEIR MIND IS TROUBLED. HOW STRESSED ARE YOU?: TO SOME EXTENT

## 2024-08-29 SDOH — ECONOMIC STABILITY: INCOME INSECURITY: HOW HARD IS IT FOR YOU TO PAY FOR THE VERY BASICS LIKE FOOD, HOUSING, MEDICAL CARE, AND HEATING?: NOT VERY HARD

## 2024-08-29 SDOH — HEALTH STABILITY: PHYSICAL HEALTH: ON AVERAGE, HOW MANY MINUTES DO YOU ENGAGE IN EXERCISE AT THIS LEVEL?: 120 MIN

## 2024-08-29 SDOH — HEALTH STABILITY: PHYSICAL HEALTH: ON AVERAGE, HOW MANY DAYS PER WEEK DO YOU ENGAGE IN MODERATE TO STRENUOUS EXERCISE (LIKE A BRISK WALK)?: 6 DAYS

## 2024-08-29 SDOH — ECONOMIC STABILITY: INCOME INSECURITY: IN THE LAST 12 MONTHS, WAS THERE A TIME WHEN YOU WERE NOT ABLE TO PAY THE MORTGAGE OR RENT ON TIME?: NO

## 2024-08-29 SDOH — ECONOMIC STABILITY: FOOD INSECURITY: WITHIN THE PAST 12 MONTHS, THE FOOD YOU BOUGHT JUST DIDN'T LAST AND YOU DIDN'T HAVE MONEY TO GET MORE.: NEVER TRUE

## 2024-08-29 SDOH — ECONOMIC STABILITY: FOOD INSECURITY: WITHIN THE PAST 12 MONTHS, YOU WORRIED THAT YOUR FOOD WOULD RUN OUT BEFORE YOU GOT MONEY TO BUY MORE.: NEVER TRUE

## 2024-08-29 ASSESSMENT — ENCOUNTER SYMPTOMS
NAUSEA: 0
ABDOMINAL PAIN: 0
COUGH: 0
FEVER: 0
VOMITING: 0
CHILLS: 0
SORE THROAT: 0
PALPITATIONS: 0
SHORTNESS OF BREATH: 0

## 2024-08-29 ASSESSMENT — LIFESTYLE VARIABLES
HOW OFTEN DO YOU HAVE A DRINK CONTAINING ALCOHOL: MONTHLY OR LESS
HOW MANY STANDARD DRINKS CONTAINING ALCOHOL DO YOU HAVE ON A TYPICAL DAY: PATIENT DOES NOT DRINK
HOW OFTEN DO YOU HAVE SIX OR MORE DRINKS ON ONE OCCASION: NEVER
AUDIT-C TOTAL SCORE: 1
SKIP TO QUESTIONS 9-10: 1

## 2024-08-29 ASSESSMENT — SOCIAL DETERMINANTS OF HEALTH (SDOH)
HOW HARD IS IT FOR YOU TO PAY FOR THE VERY BASICS LIKE FOOD, HOUSING, MEDICAL CARE, AND HEATING?: NOT VERY HARD
ARE YOU MARRIED, WIDOWED, DIVORCED, SEPARATED, NEVER MARRIED, OR LIVING WITH A PARTNER?: NEVER MARRIED
DO YOU BELONG TO ANY CLUBS OR ORGANIZATIONS SUCH AS CHURCH GROUPS UNIONS, FRATERNAL OR ATHLETIC GROUPS, OR SCHOOL GROUPS?: YES
HOW OFTEN DO YOU GET TOGETHER WITH FRIENDS OR RELATIVES?: ONCE A WEEK
DO YOU BELONG TO ANY CLUBS OR ORGANIZATIONS SUCH AS CHURCH GROUPS UNIONS, FRATERNAL OR ATHLETIC GROUPS, OR SCHOOL GROUPS?: YES
ARE YOU MARRIED, WIDOWED, DIVORCED, SEPARATED, NEVER MARRIED, OR LIVING WITH A PARTNER?: NEVER MARRIED
IN A TYPICAL WEEK, HOW MANY TIMES DO YOU TALK ON THE PHONE WITH FAMILY, FRIENDS, OR NEIGHBORS?: MORE THAN THREE TIMES A WEEK
HOW OFTEN DO YOU ATTEND CHURCH OR RELIGIOUS SERVICES?: NEVER
HOW OFTEN DO YOU HAVE A DRINK CONTAINING ALCOHOL: MONTHLY OR LESS
WITHIN THE PAST 12 MONTHS, YOU WORRIED THAT YOUR FOOD WOULD RUN OUT BEFORE YOU GOT THE MONEY TO BUY MORE: NEVER TRUE
HOW OFTEN DO YOU GET TOGETHER WITH FRIENDS OR RELATIVES?: ONCE A WEEK
HOW OFTEN DO YOU ATTEND CHURCH OR RELIGIOUS SERVICES?: NEVER
IN THE PAST 12 MONTHS, HAS THE ELECTRIC, GAS, OIL, OR WATER COMPANY THREATENED TO SHUT OFF SERVICE IN YOUR HOME?: NO
IN A TYPICAL WEEK, HOW MANY TIMES DO YOU TALK ON THE PHONE WITH FAMILY, FRIENDS, OR NEIGHBORS?: MORE THAN THREE TIMES A WEEK
HOW MANY DRINKS CONTAINING ALCOHOL DO YOU HAVE ON A TYPICAL DAY WHEN YOU ARE DRINKING: PATIENT DOES NOT DRINK
HOW OFTEN DO YOU HAVE SIX OR MORE DRINKS ON ONE OCCASION: NEVER
HOW OFTEN DO YOU ATTENT MEETINGS OF THE CLUB OR ORGANIZATION YOU BELONG TO?: NEVER
HOW OFTEN DO YOU ATTENT MEETINGS OF THE CLUB OR ORGANIZATION YOU BELONG TO?: NEVER

## 2024-08-29 ASSESSMENT — FIBROSIS 4 INDEX: FIB4 SCORE: 1.73

## 2024-08-29 NOTE — PROGRESS NOTES
Verbal consent was acquired by the patient to use [employer] ambient listening note generation during this visit.    Subjective:     HPI:   History of Present Illness  The patient is a 43-year-old male who presents to establish care and for medication refills.  Patient's last provider was Dr. Dickerson.  Patient does have a history of cerebrovascular accident secondary to brain bleed.  Patient reports he did attend significant rehabilitation and physical therapy as well as speech therapy in the past but is not currently enrolled.  Patient continues to have right sided extremity upper and lower weakness and spasticity.  Patient does well on current regimen of baclofen up to 3 times a day.  He continues to have some speech disturbance.  Patient also has some noted right sided facial weakness without facial droop.  Patient no longer requires any assistive devices such as cane walker or wheelchair.  Patient denies any recent falls or injuries.He does not engage in physical therapy but maintains an active lifestyle by going to the gym almost daily.    He is under the care of a urologist who prescribes Myrbetriq. He also takes baclofen for spasticity issues and requires a refill. He has never been prescribed statins.     He uses over-the-counter eyedrops for dry eyes and takes one capsule of B12 daily.    SOCIAL HISTORY  The patient denies smoking, nicotine, vaping, alcohol or drugs.    IMMUNIZATIONS  He has received 2 HPV vaccines. He got his influenza vaccine last year.  Review of Systems   Constitutional:  Negative for chills and fever.   HENT:  Negative for congestion and sore throat.    Respiratory:  Negative for cough and shortness of breath.    Cardiovascular:  Negative for chest pain and palpitations.   Gastrointestinal:  Negative for abdominal pain, nausea and vomiting.       Health Maintenance: Completed    Objective:     Exam:  /58 (BP Location: Left arm, Patient Position: Sitting, BP Cuff Size: Adult)    "Pulse 99   Ht 1.727 m (5' 8\")   Wt 63.2 kg (139 lb 6.4 oz)   SpO2 98%   BMI 21.20 kg/m²  Body mass index is 21.2 kg/m².    Physical Exam  Vitals reviewed.   Constitutional:       Appearance: Normal appearance.   HENT:      Head: Normocephalic and atraumatic.      Nose: Nose normal.   Cardiovascular:      Rate and Rhythm: Normal rate and regular rhythm.      Pulses: Normal pulses.      Heart sounds: Normal heart sounds. No murmur heard.  Pulmonary:      Effort: Pulmonary effort is normal.      Breath sounds: Normal breath sounds.   Abdominal:      General: Abdomen is flat.      Palpations: Abdomen is soft.   Skin:     General: Skin is warm and dry.   Neurological:      Mental Status: He is alert.   Psychiatric:         Mood and Affect: Mood normal.         Behavior: Behavior normal.     Spasticity and significant weakness noted in the right upper extremity.  3 out of 5 weakness noted in the right lower extremity with minimal spasticity.  Sensation decreased significantly in both the right upper and lower extremity.        Results      Assessment & Plan:     1. Need for HPV vaccine  Gardasil 9      2. Spasticity due to old stroke  baclofen (LIORESAL) 20 MG tablet      3. Preventative health care  CBC WITHOUT DIFFERENTIAL    TSH WITH REFLEX TO FT4    HEMOGLOBIN A1C    Lipid Profile    Comp Metabolic Panel      4. Hypercholesterolemia  CBC WITHOUT DIFFERENTIAL    TSH WITH REFLEX TO FT4    HEMOGLOBIN A1C    Lipid Profile    Comp Metabolic Panel      5. Overactive bladder        6. Hemiplegia affecting dominant side (HCC)        7. Brain bleed (HCC)  CBC WITHOUT DIFFERENTIAL    TSH WITH REFLEX TO FT4    HEMOGLOBIN A1C    Lipid Profile    Comp Metabolic Panel          Assessment & Plan  1. History of Stroke.  He had a stroke at the age of 22, which was a brain bleed rather than a clot. There have been no subsequent strokes. He is advised to stay on top of risk factors for cardiovascular complications. He does not use " any assistive devices and has not experienced any recent falls. He continues to work on strengthening his weak side at the gym almost daily. He is not experiencing any speech issues or facial droop currently, although he did initially have facial droop on the right side. He is not on aspirin or a statin currently, but if future lab results indicate elevated LDL levels, a discussion about starting a low-dose statin will be revisited to help prevent further strokes.    2. Hyperlipidemia.  His LDL levels were slightly elevated in the previous lab results from January 2024.  A recheck of his cholesterol levels will be done before the next visit. If LDL levels remain elevated, the initiation of a low-dose statin will be considered.    3. Spasticity.  He takes baclofen 10 mg TD PRN for spasticity and tremor. A prescription for baclofen will be sent to his pharmacy.    4. Health Maintenance.  He has completed two doses of the HPV vaccine and is due for the third dose, which will be administered today. He has not received the hepatitis B vaccine and has chosen not to receive it at this time. He declined the HIV and hepatitis C screening tests today. A comprehensive set of labs will be ordered, including cholesterol, electrolytes, kidney function, liver function, blood sugar over a 3-month period, thyroid, and blood levels. These tests should be completed while fasting.    Follow-up  He will follow up in 6 months for an annual visit, after the completion of his lab tests.        Return in about 6 months (around 2/28/2025) for Annual/wellness visit.    Please note that this dictation was created using voice recognition software. I have made every reasonable attempt to correct obvious errors, but I expect that there are errors of grammar and possibly content that I did not discover before finalizing the note.    Brittny Marte MD  Family Medicine and Non - Operative Sports Medicine   [facility] [place]

## 2025-02-25 ENCOUNTER — RESULTS FOLLOW-UP (OUTPATIENT)
Dept: MEDICAL GROUP | Facility: PHYSICIAN GROUP | Age: 45
End: 2025-02-25

## 2025-02-25 ENCOUNTER — HOSPITAL ENCOUNTER (OUTPATIENT)
Dept: LAB | Facility: MEDICAL CENTER | Age: 45
End: 2025-02-25
Attending: FAMILY MEDICINE
Payer: MEDICARE

## 2025-02-25 DIAGNOSIS — Z00.00 PREVENTATIVE HEALTH CARE: ICD-10-CM

## 2025-02-25 DIAGNOSIS — E78.00 HYPERCHOLESTEROLEMIA: ICD-10-CM

## 2025-02-25 DIAGNOSIS — I61.9 BRAIN BLEED (HCC): ICD-10-CM

## 2025-02-25 LAB
ALBUMIN SERPL BCP-MCNC: 4.4 G/DL (ref 3.2–4.9)
ALBUMIN/GLOB SERPL: 1.8 G/DL
ALP SERPL-CCNC: 69 U/L (ref 30–99)
ALT SERPL-CCNC: 20 U/L (ref 2–50)
ANION GAP SERPL CALC-SCNC: 10 MMOL/L (ref 7–16)
AST SERPL-CCNC: 29 U/L (ref 12–45)
BILIRUB SERPL-MCNC: 0.7 MG/DL (ref 0.1–1.5)
BUN SERPL-MCNC: 14 MG/DL (ref 8–22)
CALCIUM ALBUM COR SERPL-MCNC: 8.8 MG/DL (ref 8.5–10.5)
CALCIUM SERPL-MCNC: 9.1 MG/DL (ref 8.5–10.5)
CHLORIDE SERPL-SCNC: 105 MMOL/L (ref 96–112)
CHOLEST SERPL-MCNC: 168 MG/DL (ref 100–199)
CO2 SERPL-SCNC: 27 MMOL/L (ref 20–33)
CREAT SERPL-MCNC: 1.07 MG/DL (ref 0.5–1.4)
ERYTHROCYTE [DISTWIDTH] IN BLOOD BY AUTOMATED COUNT: 43.3 FL (ref 35.9–50)
EST. AVERAGE GLUCOSE BLD GHB EST-MCNC: 120 MG/DL
GFR SERPLBLD CREATININE-BSD FMLA CKD-EPI: 88 ML/MIN/1.73 M 2
GLOBULIN SER CALC-MCNC: 2.4 G/DL (ref 1.9–3.5)
GLUCOSE SERPL-MCNC: 102 MG/DL (ref 65–99)
HBA1C MFR BLD: 5.8 % (ref 4–5.6)
HCT VFR BLD AUTO: 48.8 % (ref 42–52)
HDLC SERPL-MCNC: 59 MG/DL
HGB BLD-MCNC: 16.1 G/DL (ref 14–18)
LDLC SERPL CALC-MCNC: 91 MG/DL
MCH RBC QN AUTO: 30.1 PG (ref 27–33)
MCHC RBC AUTO-ENTMCNC: 33 G/DL (ref 32.3–36.5)
MCV RBC AUTO: 91.4 FL (ref 81.4–97.8)
PLATELET # BLD AUTO: 196 K/UL (ref 164–446)
PMV BLD AUTO: 9.7 FL (ref 9–12.9)
POTASSIUM SERPL-SCNC: 3.7 MMOL/L (ref 3.6–5.5)
PROT SERPL-MCNC: 6.8 G/DL (ref 6–8.2)
RBC # BLD AUTO: 5.34 M/UL (ref 4.7–6.1)
SODIUM SERPL-SCNC: 142 MMOL/L (ref 135–145)
TRIGL SERPL-MCNC: 89 MG/DL (ref 0–149)
TSH SERPL DL<=0.005 MIU/L-ACNC: 1.28 UIU/ML (ref 0.38–5.33)
WBC # BLD AUTO: 5.3 K/UL (ref 4.8–10.8)

## 2025-02-25 PROCEDURE — 80061 LIPID PANEL: CPT

## 2025-02-25 PROCEDURE — 80053 COMPREHEN METABOLIC PANEL: CPT

## 2025-02-25 PROCEDURE — 84443 ASSAY THYROID STIM HORMONE: CPT

## 2025-02-25 PROCEDURE — 83036 HEMOGLOBIN GLYCOSYLATED A1C: CPT

## 2025-02-25 PROCEDURE — 36415 COLL VENOUS BLD VENIPUNCTURE: CPT

## 2025-02-25 PROCEDURE — 85027 COMPLETE CBC AUTOMATED: CPT

## 2025-03-03 ENCOUNTER — OFFICE VISIT (OUTPATIENT)
Dept: MEDICAL GROUP | Facility: PHYSICIAN GROUP | Age: 45
End: 2025-03-03
Payer: MEDICARE

## 2025-03-03 VITALS
OXYGEN SATURATION: 98 % | TEMPERATURE: 97.8 F | SYSTOLIC BLOOD PRESSURE: 106 MMHG | DIASTOLIC BLOOD PRESSURE: 60 MMHG | HEIGHT: 68 IN | BODY MASS INDEX: 21.46 KG/M2 | HEART RATE: 80 BPM | WEIGHT: 141.6 LBS

## 2025-03-03 DIAGNOSIS — R26.9 ABNORMALITY OF GAIT AS LATE EFFECT OF CEREBROVASCULAR ACCIDENT (CVA): ICD-10-CM

## 2025-03-03 DIAGNOSIS — R25.2 SPASTICITY DUE TO OLD STROKE: ICD-10-CM

## 2025-03-03 DIAGNOSIS — I69.398 SPASTICITY DUE TO OLD STROKE: ICD-10-CM

## 2025-03-03 DIAGNOSIS — R73.03 PREDIABETES: ICD-10-CM

## 2025-03-03 DIAGNOSIS — G81.90 HEMIPLEGIA AFFECTING DOMINANT SIDE (HCC): ICD-10-CM

## 2025-03-03 DIAGNOSIS — I69.398 ABNORMALITY OF GAIT AS LATE EFFECT OF CEREBROVASCULAR ACCIDENT (CVA): ICD-10-CM

## 2025-03-03 PROBLEM — K05.30 PERIODONTITIS: Chronic | Status: ACTIVE | Noted: 2024-09-30

## 2025-03-03 PROCEDURE — 3078F DIAST BP <80 MM HG: CPT | Performed by: FAMILY MEDICINE

## 2025-03-03 PROCEDURE — 3074F SYST BP LT 130 MM HG: CPT | Performed by: FAMILY MEDICINE

## 2025-03-03 PROCEDURE — 99214 OFFICE O/P EST MOD 30 MIN: CPT | Performed by: FAMILY MEDICINE

## 2025-03-03 ASSESSMENT — PATIENT HEALTH QUESTIONNAIRE - PHQ9: CLINICAL INTERPRETATION OF PHQ2 SCORE: 0

## 2025-03-03 ASSESSMENT — FIBROSIS 4 INDEX: FIB4 SCORE: 1.42

## 2025-03-03 NOTE — PROGRESS NOTES
"Verbal consent was acquired by the patient to use [employer] ambient listening note generation during this visit.    Subjective:     HPI:   History of Present Illness  The patient is a 44-year-old male presenting for evaluation of prediabetes, spasticity, and a history of cerebrovascular accident (CVA).    The patient maintains an active lifestyle, engaging in physical exercise 6 to 7 days per week under the supervision of a , with whom he consults weekly.    He is under the ongoing care of a urologist and is currently prescribed baclofen and mirabegron (Myrbetriq).    His past medical history is significant for a cerebrovascular accident approximately 21 years ago. He is not currently on any antihypertensive medications. The patient has a notable smoking history, having ceased tobacco use a decade ago following a 15-year period of smoking.    SOCIAL HISTORY  He quit smoking 10 years ago after smoking for about 15 years.    MEDICATIONS  Baclofen, Myrbetriq    IMMUNIZATIONS  He has received all of his HPV shots.      Health Maintenance: Completed    Objective:     Exam:  /60 (BP Location: Right arm, Patient Position: Sitting, BP Cuff Size: Adult)   Pulse 80   Temp 36.6 °C (97.8 °F) (Temporal)   Ht 1.727 m (5' 8\")   Wt 64.2 kg (141 lb 9.6 oz)   SpO2 98%   BMI 21.53 kg/m²  Body mass index is 21.53 kg/m².    Physical Exam  Vitals reviewed.   Constitutional:       Appearance: Normal appearance.   HENT:      Head: Normocephalic and atraumatic.      Nose: Nose normal.   Cardiovascular:      Rate and Rhythm: Normal rate and regular rhythm.      Pulses: Normal pulses.      Heart sounds: Normal heart sounds. No murmur heard.  Pulmonary:      Effort: Pulmonary effort is normal.      Breath sounds: Normal breath sounds.   Abdominal:      General: Abdomen is flat.      Palpations: Abdomen is soft.   Skin:     General: Skin is warm and dry.   Neurological:      Mental Status: He is alert. "   Psychiatric:         Mood and Affect: Mood normal.         Behavior: Behavior normal.             Results  Laboratory Studies  Kidney function is normal. A1c is slightly elevated. Cholesterol levels are low.    Assessment & Plan:     1. Spasticity due to old stroke  Referral to Physical Therapy    CANCELED: Referral to Physical Therapy      2. Hemiplegia affecting dominant side (HCC)  Referral to Physical Therapy    CANCELED: Referral to Physical Therapy      3. Prediabetes        4. 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 Prediabetes.  His A1c levels are marginally elevated, indicating a prediabetic state. He is advised to reduce the intake of sugary foods, sodas, chips, potatoes, and bread, and to maintain regular exercise. A repeat A1c test will be conducted in 6 months.    2. Spasticity.  A referral for physical therapy will be initiated to address his spasticity and other chronic issues.    3. Stroke.  Given his history of stroke, the initiation of statin therapy is recommended. However, his cholesterol levels are currently within the normal range, and his blood pressure is well-regulated. Annual cholesterol monitoring will be conducted.    4. Health Maintenance.  He is eligible for the hepatitis B vaccine, which is a 3-dose series. The benefits and risks of the vaccine were discussed, including its importance for healthcare workers and travelers to third-world countries.    Follow-up  The patient will follow up in 6 months.    HCC Gap Form    Diagnosis to address: G81.90 - Hemiplegia affecting dominant side (HCC)  Assessment and plan: Chronic, stable. Continue with current defined treatment plan: physical therapy as needed, referral placed in office today. Continue to manage risk factors. Follow-up at least annually.  Last edited 03/03/25 14:47 PST by Brittny Marte M.D.           Return in about 6 months (around 9/3/2025) for Annual/wellness  visit.    Please note that this dictation was created using voice recognition software. I have made every reasonable attempt to correct obvious errors, but I expect that there are errors of grammar and possibly content that I did not discover before finalizing the note.    Brittny Marte MD  Family Medicine and Non - Operative Sports Medicine   [facility]

## 2025-03-05 NOTE — Clinical Note
REFERRAL APPROVAL NOTICE         Sent on March 5, 2025                   Fritz Ruggiero  [address]  [place] [address]                   Dear Mr. Ruggiero,    After a careful review of the medical information and benefit coverage, [facility] has processed your referral. See below for additional details.    If applicable, you must be actively enrolled with your insurance for coverage of the authorized service. If you have any questions regarding your coverage, please contact your insurance directly.    REFERRAL INFORMATION   Referral #:  [id number]  Referred-To Department    Referred-By Provider:  Physical Therapy    Brittny Marte M.D.   [facility] [address]      [address]  [place] [address] [phone] [address]  [address]  [place] [id number] [phone]    Referral Start Date:  03/03/2025  Referral End Date:   03/03/2026             SCHEDULING  If you do not already have an appointment, please call [phone] to make an appointment.     MORE INFORMATION  If you do not already have a [employer] account, sign up at: [email or web address]  You can access your medical information, make appointments, see lab results, billing information, and more.  If you have questions regarding this referral, please contact  the [facility] Referrals department at:             [phone]. Monday - Friday 8:00AM - 5:00PM.     Sincerely,    [facility]

## 2025-04-03 ENCOUNTER — PHYSICAL THERAPY (OUTPATIENT)
Dept: PHYSICAL THERAPY | Facility: REHABILITATION | Age: 45
End: 2025-04-03
Attending: FAMILY MEDICINE
Payer: MEDICARE

## 2025-04-03 DIAGNOSIS — I69.398 SPASTICITY DUE TO OLD STROKE: ICD-10-CM

## 2025-04-03 DIAGNOSIS — R25.2 SPASTICITY DUE TO OLD STROKE: ICD-10-CM

## 2025-04-03 DIAGNOSIS — G81.90 HEMIPLEGIA AFFECTING DOMINANT SIDE (HCC): ICD-10-CM

## 2025-04-03 PROCEDURE — 97162 PT EVAL MOD COMPLEX 30 MIN: CPT

## 2025-04-03 PROCEDURE — 97110 THERAPEUTIC EXERCISES: CPT

## 2025-04-03 SDOH — ECONOMIC STABILITY: GENERAL: QUALITY OF LIFE: GOOD

## 2025-04-03 ASSESSMENT — ACTIVITIES OF DAILY LIVING (ADL): POOR_BALANCE: 1

## 2025-04-03 ASSESSMENT — ENCOUNTER SYMPTOMS
QUALITY: ACHING
PAIN SCALE AT LOWEST: 0
PAIN SCALE AT HIGHEST: 6
QUALITY: TINGLING
PAIN SCALE: 1

## 2025-04-03 NOTE — OP THERAPY EVALUATION
Outpatient Physical Therapy  INITIAL EVALUATION    [facility] [address]  [address]  [place] [address]  Phone:  [phone]  Fax:  [phone]    Date of Evaluation: 04/03/2025    Patient: Fritz Ruggiero  YOB: 1980  MRN: [id number]     Referring Provider: [address]  [place] [address]   Referring Diagnosis No admission diagnoses are documented for this encounter.     Time Calculation  Start time: 0930  Stop time: 1015 Time Calculation (min): 45 minutes         Chief Complaint: Loss Of Balance (Spasticity and general R side weakness ) and Shoulder Problem    Visit Diagnoses     ICD-10-CM   1. Spasticity due to old stroke  I69.398    R25.2   2. Hemiplegia affecting dominant side (HCC)  G81.90       Date of onset of impairment: 4/3/2004    Subjective:   History of Present Illness:     Date of onset:  4/3/2004    Mechanism of injury:  Pt is a 45 yo male presenting to PT with c/o chronic weakness and spasticity on R side. Pt reports history of L CVA 21 years ago and chronic weakness/ spasticity. Pt reports he would like his R side to work like his left. Pt reports post stroke he completed extensive PT and it did help but he plateau with his care. Pt reports he started going to the gym 4-5 years ago. Pt reports he has got stronger since starting that and more endurance. Pt reports dependent on the season he can feel looser in the RUE or tighter. Pt reports he does have weakness and tightness in the RUE and RLE. Pt reports at the gym he is completing sustained stretching which has helped (3-4 months) and all lifting movements (hinge,squat, push and pull) with his new  for the last 3-4 months. Pt does reports some R shoulder pain with lifting exercises but those have since gone away with a change in position. Pt denies any falls in the last year. Pt does not utilized any AD within the home or community. He lives an  overall active lifestyle being outdoors completing hikes. Pt has a history of using Baclofen since stroke 21 years ago for spasticity.     Quality of life:  Good  Pain:     Current pain ratin (R shoulder with Lat pull downs)    At best pain ratin    At worst pain ratin (with certain lifts)    Quality:  Tingling and aching    Pain timing: when doing lat pull down, and row.    Progression:  Stable  Hand dominance:  Ambidextrous (L side since stroke was R handed prior)  Treatments:     Previous treatment:  Physical therapy (post stroke 21 years ago)  Activities of Daily Living:     Patient reported ADL status: Pt reports he does not work. Pt reports all ADL's are independent as he has figured out techniques that work for him since the stroke 21 years ago.   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    Arthralgia 2009    Autoimmune disease NEC     Autoimmune disease    CVA (cerebral vascular accident) (HCC) 2003    Dysphagia as late effect of cerebrovascular accident (CVA) 2003    Facial weakness as late effect of cerebrovascular accident (CVA)     Neck pain 2009    Shoulder pain 2009    Spastic hemiplegia and hemiparesis ([facility])     Use of cane as ambulatory aid      Past Surgical History:   Procedure Laterality Date    INSERTION GASTROSTOMY TUBE FOR FEEDING      DENTAL EXTRACTION(S)       Social History     Tobacco Use    Smoking status: Former     Current packs/day: 0.00     Average packs/day: 0.5 packs/day for 10.0 years (5.0 ttl pk-yrs)     Types: Cigarettes     Start date: 2007     Quit date: 2017     Years since quittin.6    Smokeless tobacco: Never   Substance Use Topics     Alcohol use: No     Alcohol/week: 0.0 oz     Family and Occupational History     Socioeconomic History    Marital status: Single     Spouse name: Not on file    Number of children: Not on file    Years of education: Not on file    Highest education level: Some college, no degree   Occupational History    Not on file       Objective     Postural Observations  Seated posture: fair  Standing posture: fair    Additional Postural Observation Details  Pt demos a flexion synergy pattern with RUE.     Cervical Screen    Cervical range of motion within normal limits  Thoracic Screen    Thoracic range of motion within normal limits    Neurological Testing     Reflexes   Left   Biceps (C5/C6): normal (2+)  Patellar (L4): normal (2+)  Achilles (S1): normal (2+)    Right   Biceps (C5/C6): brisk (3+)  Brachioradialis (C6): brisk (3+)  Patellar (L4): nonsustained clonus (4+)  Achilles (S1): nonsustained clonus (4+)    Palpation     Additional Palpation Details  RUE musculature is all hypertonic   Hypertonic in all RLE musculature     Active Range of Motion   Left Shoulder   Normal active range of motion    Right Shoulder   Flexion: 70 degrees   Extension: 60 degrees     Joint Play   Right Shoulder     Anterior capsule: hypomobile    Posterior capsule: hypomobile    Inferior capsule: hypomobile    Strength:      Left Shoulder   Normal muscle strength    Lower extremities   Normal left lower extremity strength    Right Hip   Planes of Motion   Flexion: 4-  Extension: 4-    Right Knee   Right knee flexion strength: cant complete due to tone.  Extension: 3+    Additional Strength Details  RUE 3+ to 4- in alternative position due to increased tone   5 time sit to stand: 12 secs no hands     Functional Assessment     Comments  Aaron Scale:   RUE   Shoulder 2  Elbow 3  Wrist 3    RLE  Hip 2  Knee flex 3 Ext 2  Ankle 2 with clonus         Therapeutic Exercises (CPT 63871):     1. sustained stretches for RUE, shoulder, elbow, wrist, and  fingers      Therapeutic Exercise Summary: Pt was given HEP prior to leaving and verbalized understanding.  Access code:       Time-based treatments/modalities:    Physical Therapy Timed Treatment Charges  Therapeutic exercise minutes (CPT 82580): 15 minutes      Assessment, Response and Plan:   Impairments: abnormal gait, abnormal muscle firing, abnormal muscle tone, abnormal or restricted ROM, activity intolerance, impaired functional mobility and impaired balance    Assessment details:  Pt is a 43 yo cis-male presenting to PT w/ clinical findings suggestive of chronic R side spasticity and weakness. Pertinent clinical findings include increased tone in RUE and RLE, leading to general R sided weakness. Impairments are associated w/ minimal decreased functional activity tolerance. Discussed with pt that due to his long term spasticity and history of stroke major changes in AROM, spasticity and strength may not be attainable. However The patient would benefit from skilled PT to address strength and ROM deficits in order to increase participation with daily activities. The patient states they understand and agree with the plan of care. HEP w/ handout was reviewed and provided to the pt.    Barriers to therapy:  Comorbidities  Prognosis: fair    Goals:   Short Term Goals:   1. Pt will be independent with HEP 3-5x per week for improving strength, ROM and decreasing pain  2. Pt will complete most appropriate balance outcome measure  Short term goal time span:  6-8 weeks      Long Term Goals:    1. Pt will demo improved 5 time sit to stand with in MCID   2. Pt will demo improved balance scoring low fall risk on most appropriate outcome measure  3. Pt will demo proper form with gym related activities to ensure safety     Long term goal time span:  4-6 months    Plan:   Therapy options:  Physical therapy treatment to continue  Planned therapy interventions:  Neuromuscular Re-education (CPT 87689), Manual Therapy (CPT  79605), Therapeutic Exercise (CPT 36342), Therapeutic Activities (CPT 31827) and E Stim Unattended (CPT 85838)  Frequency:  2x week  Duration in weeks:  6  Duration in visits:  12  Discussed with:  Patient      Functional Assessment Used  PT Functional Assessment Tool Used: ABC Scale  PT Functional Assessment Score: 96.9%     Referring provider co-signature:  I have reviewed this plan of care and my co-signature certifies the need for services.    Certification Period: 04/03/2025 to  05/22/25    Physician Signature: ________________________________ Date: ______________

## 2025-04-09 NOTE — OP THERAPY DAILY TREATMENT
"  Outpatient Physical Therapy  DAILY TREATMENT     [facility] [address]  [address]  [place] [address]  Phone:  [phone]  Fax:  [phone]    Date: 04/10/2025    Patient: Fritz Ruggiero  YOB: 1980  MRN: [id number]     Time Calculation    Start time: 0930  Stop time: 1015 Time Calculation (min): 45 minutes         Chief Complaint: No chief complaint on file.    Visit #: 2    SUBJECTIVE:  He states that he is having trouble w/ his hand locking up when he is exercising or when it's really cold. He has never had botox in his hand but he did have some previously in his leg which did not help.   He wants to be able to hike w/ his dad. Last time he tried it didn't go well b/c his R leg is weak. The weight on his back and navigating the uneven surface was challenging w/ his walking stick.   He has a  once a week but goes to the gym every day.     Aggs;  Hand locks up w/ exercise and cold  Hike-difficult w/ walking stick and uneven surface.   Row and flies cause pop that is not painful in the R shoulder but limiting    OBJECTIVE:          Therapeutic Exercises (CPT 34220):     1. sustained stretches for RUE, shoulder, elbow, wrist, and fingers, reviewed      Therapeutic Exercise Summary: Verbal HEP: cont UE stretching, airex balance narrow stand and mod tandem w/ R weight shift and head turns. Perform step tap on bottom step w/ R LE    Therapeutic Treatments and Modalities:     1. Neuromuscular Re-education (CPT 63001), balance training below    Therapeutic Treatment and Modalities Summary: Airex narrow stance w/ head turns: 2x2' cued for R weight shift  Airex mod tandem stance w/ R weight shift: 2x2\" ea    Slow walking march: 50'x6  Suitcase carry march w/ 5#: trials R UE hold but unable to safely . Carried w/ L UE. 50'x6    Step tap 4\" 2x15 on R    1/2 FR step over 3x8    Time-based treatments/modalities:    Physical Therapy Timed Treatment " Charges  Neuromusc re-ed, balance, coor, post minutes (CPT 13620): 35 minutes  Therapeutic exercise minutes (CPT 28794): 10 minutes    ASSESSMENT:   Pt had WFL shoulder flex, elbow ext, and wrist flexion w/ PROM but is limited in supination and and wrist ext. He was advised to discuss options w/ PCP about possible referral to neurology to discuss spasticity management.   He required CGA throughout session for balance training. He had sig difficulty stepping over 1/2 FR d/t catching R LE when posterior. He has very good reactive balance on L LE. Pt will cont to benefit from higher level balance training and strengthening to help reach goal of hiking.     PLAN/RECOMMENDATIONS:   Plan for treatment: therapy treatment to continue next visit.  Planned interventions for next visit: continue with current treatment.

## 2025-04-10 ENCOUNTER — PHYSICAL THERAPY (OUTPATIENT)
Dept: PHYSICAL THERAPY | Facility: REHABILITATION | Age: 45
End: 2025-04-10
Attending: FAMILY MEDICINE
Payer: MEDICARE

## 2025-04-10 DIAGNOSIS — G81.90 HEMIPLEGIA AFFECTING DOMINANT SIDE (HCC): ICD-10-CM

## 2025-04-10 DIAGNOSIS — I69.398 SPASTICITY DUE TO OLD STROKE: ICD-10-CM

## 2025-04-10 DIAGNOSIS — R25.2 SPASTICITY DUE TO OLD STROKE: ICD-10-CM

## 2025-04-10 PROCEDURE — 97110 THERAPEUTIC EXERCISES: CPT

## 2025-04-10 PROCEDURE — 97112 NEUROMUSCULAR REEDUCATION: CPT

## 2025-04-15 ENCOUNTER — PHYSICAL THERAPY (OUTPATIENT)
Dept: PHYSICAL THERAPY | Facility: REHABILITATION | Age: 45
End: 2025-04-15
Attending: FAMILY MEDICINE
Payer: MEDICARE

## 2025-04-15 DIAGNOSIS — R25.2 SPASTICITY DUE TO OLD STROKE: ICD-10-CM

## 2025-04-15 DIAGNOSIS — G81.90 HEMIPLEGIA AFFECTING DOMINANT SIDE (HCC): ICD-10-CM

## 2025-04-15 DIAGNOSIS — I69.398 SPASTICITY DUE TO OLD STROKE: ICD-10-CM

## 2025-04-15 PROCEDURE — 97110 THERAPEUTIC EXERCISES: CPT

## 2025-04-15 NOTE — OP THERAPY DAILY TREATMENT
"  Outpatient Physical Therapy  DAILY TREATMENT     [facility] Physical [address]  [address]  [place] [address]  Phone:  [phone]  Fax:  [phone]    Date: 04/15/2025    Patient: Fritz Ruggiero  YOB: 1980  MRN: [id number]     Time Calculation    Start time: 0745  Stop time: 0828 Time Calculation (min): 43 minutes         Chief Complaint: Weakness and Loss Of Balance    Visit #: 3    SUBJECTIVE:  Pt's main goals are to backpack with dad, reports wearing backpack is the hardest part of getting involved and feels that it weighs down on R side. Pack is no more than 30lbs with internal frame. Feels walking on rocks, sand, and uneven terrain is fine if he has a hiking stick. Feels a \"sinking\" feeling when carrying objects with L hand and walking.    OBJECTIVE:  Current objective measures:           Therapeutic Exercises (CPT 10572):     20. 4/3-5/22      Therapeutic Exercise Summary: MAS right quad 3  MAS Rt HS 1  MAS Rt gastroc 2  Javi test knee ext unable to flex without OP due to spasticity    Gait observations: R knee extension throughout gait cycle, dec R knee flexion and ipsilateral hip hiking with swing, inc R plantar flexion throughout cycle, compensated glute med gait B in stance. Pt's compensated glute med gait inc when carrying 20lb DB w/ L arm farmer carry 2x30' w/ pt describing it as a \"sinking\" feeling.    Pt instructed in and performed HEP focused on improving B posterolateral hip strength and stability and tight R plantarflexors:  - R gastroc stretch x1 min B  - sidelying hip abduction x10 reps B, min vc to lift straight to side and slightly back and keep toes pointed towards shin for active DF  - side shuffle steps 2x5 reps (down and back parallel bars = 1 rep), min vc to keep hips low, wide steps, eyes up, slow steps.    Lateral stepdown x10 reps B from 8\" step with 1UE support on stair railing for balance. Min vc to hinge at hips then tap foot, keep " most of weight through top LE, and to avoid excessive lateral trunk lean.    HEP: 1x/day   - R gastroc stretch 3 x1 min  - Sidelying hip abduction 3x10 reps  - side shuffle steps 3x5 reps (1 rep = down and back kitchen counter)        Time-based treatments/modalities:    Physical Therapy Timed Treatment Charges  Therapeutic exercise minutes (CPT 99412): 43 minutes    ASSESSMENT:   Response to treatment:   Observed compensated glute med gait B which increases when farmer carrying 20lb DB's showing impaired posterolateral hip strength and stability.  Based off pt reports this is likely the sinking sensation he feels when carrying his back pack when hiking. Also observed excessive R plantarflexion throughout gait cycle due to spasticity Pt given HEP focused on improving tight R ankle plantarflexors and posterolateral hip strength and stability.    PLAN/RECOMMENDATIONS:   Plan for treatment: therapy treatment to continue next visit.  Planned interventions for next visit: continue with current treatment.      Joe    [x] As the licensed therapist supervising this student, I was present during the entire treatment session directing the care and reviewing the assessment plan.  I reviewed all documentation prior to signing.

## 2025-04-17 ENCOUNTER — PHYSICAL THERAPY (OUTPATIENT)
Dept: PHYSICAL THERAPY | Facility: REHABILITATION | Age: 45
End: 2025-04-17
Attending: FAMILY MEDICINE
Payer: MEDICARE

## 2025-04-17 DIAGNOSIS — G81.90 HEMIPLEGIA AFFECTING DOMINANT SIDE (HCC): ICD-10-CM

## 2025-04-17 DIAGNOSIS — I69.398 SPASTICITY DUE TO OLD STROKE: ICD-10-CM

## 2025-04-17 DIAGNOSIS — R25.2 SPASTICITY DUE TO OLD STROKE: ICD-10-CM

## 2025-04-17 PROCEDURE — 97112 NEUROMUSCULAR REEDUCATION: CPT

## 2025-04-17 PROCEDURE — 97110 THERAPEUTIC EXERCISES: CPT

## 2025-04-17 NOTE — OP THERAPY DAILY TREATMENT
"  Outpatient Physical Therapy  DAILY TREATMENT     [facility] Physical [address]  [address]  [place] [address]  Phone:  [phone]  Fax:  [phone]    Date: 04/17/2025    Patient: Fritz Ruggiero  YOB: 1980  MRN: [id number]     Time Calculation    Start time: 0830  Stop time: 0913 Time Calculation (min): 43 minutes         Chief Complaint: Loss Of Balance, Difficulty Walking, and Weakness    Visit #: 4    SUBJECTIVE:  Pt reports feeling a little sore from doing exercises yesterday, no new complaints otherwise.    OBJECTIVE:  Current objective measures:     Gait observations: R knee extension throughout gait cycle, dec R knee flexion and ipsilateral hip hiking with swing, inc R plantar flexion throughout cycle, compensated glute med gait B in stance. Pt's compensated glute med gait inc when carrying 20lb DB w/ L arm farmer carry 2x30' w/ pt describing it as a \"sinking\" feeling. L side vaulting to clear RLE during swing.          Therapeutic Exercises (CPT 97227):     20. 4/3-5/22      Therapeutic Exercise Summary:   To improve posterolateral SL biased strength / stability needed for walking and hiking, pt instructed in split squats and SL RDL's.  - Split squat 2x10 reps B, min vc for taking big step fwd then dropping knee straight down, min vc not to lean back as far on RLE and keep R heel down, pt uses 1UE support on parallel bars    - SL RDL x8 reps B, min vc to keep hips pointed down and not rotate up    HEP: 1x/day   - R gastroc stretch 3 x1 min  - Sidelying hip abduction 3x10 reps  - side shuffle steps 3x5 reps (1 rep = down and back kitchen counter)      Therapeutic Treatments and Modalities:     1. Neuromuscular Re-education (CPT 06276)    Therapeutic Treatment and Modalities Summary:   NMR: to improve RLE motor coordination by increasing R knee flexion in swing phase and decreasing ipsilateral hip hiking  - fwd 6\" step up with opp side march at top x5 reps B " "SBA no UE. Min vc for movement sequencing and to balance for 1 sec at top  - fwd 8\" step up w/ opp side march at top x5 reps B SBA no UE, min vc to bend knee back and not kick straight to clear step up box.  - fwd 6\" step up with opp side march at top starting with RLE in terminal stance x10 reps B SBA 1UE support in parallel bars. Min-ModA to facilitate R knee flexion in initial and mid swing as pt tended to hike hip ipsilaterally and keep knee straight. After 5-6 reps manual facilitation, pt able to initiate R knee flexion in initial swing on own.  - fwd chery step overs 3x10 reps, first set Min-ModA to facilitate R knee flexion in initial swing, pt able to initiate R knee flexion on own after, and able to carryover into regular gait ~150' with 1-2 steps initiating R knee flexion in initial swing. Rest of steps, pt resorted back to keeping R knee straight and would contralaterally vault and ipsilaterally hip hike. Educated pt and encouraged to continue engraining new movement pattern every day with walking and hiking.      Time-based treatments/modalities:    Physical Therapy Timed Treatment Charges  Neuromusc re-ed, balance, coor, post minutes (CPT 45524): 30 minutes  Therapeutic exercise minutes (CPT 63857): 13 minutes    ASSESSMENT:   Response to treatment:   Session focus on improving pt's R knee flexion during initial swing phase of gait as pt tends to ipsilaterally hip hike and contralaterally vault. After manually facilitating R knee flexion during swing with chery drills, pt able to initiate R knee flexion on own during chery drills and step ups.    PLAN/RECOMMENDATIONS:   Plan for treatment: therapy treatment to continue next visit.  Planned interventions for next visit: continue with current treatment.      Joe    [x] As the licensed therapist supervising this student, I was present during the entire treatment session directing the care and reviewing the assessment plan.  I reviewed all " documentation prior to signing.

## 2025-04-21 ENCOUNTER — PHYSICAL THERAPY (OUTPATIENT)
Dept: PHYSICAL THERAPY | Facility: REHABILITATION | Age: 45
End: 2025-04-21
Attending: FAMILY MEDICINE
Payer: MEDICARE

## 2025-04-21 DIAGNOSIS — R25.2 SPASTICITY DUE TO OLD STROKE: ICD-10-CM

## 2025-04-21 DIAGNOSIS — I69.398 SPASTICITY DUE TO OLD STROKE: ICD-10-CM

## 2025-04-21 DIAGNOSIS — G81.90 HEMIPLEGIA AFFECTING DOMINANT SIDE (HCC): ICD-10-CM

## 2025-04-21 PROCEDURE — 97110 THERAPEUTIC EXERCISES: CPT

## 2025-04-21 NOTE — OP THERAPY DAILY TREATMENT
"  Outpatient Physical Therapy  DAILY TREATMENT     [facility] Physical Therapy [address]  [address]  [place] [address]  Phone:  [phone]  Fax:  [phone]    Date: 04/21/2025    Patient: Fritz Ruggiero  YOB: 1980  MRN: [id number]     Time Calculation    Start time: 0830  Stop time: 0911 Time Calculation (min): 41 minutes         Chief Complaint: Weakness    Visit #: 5    SUBJECTIVE:  Has been practicing gait changes with walking-knee flexion then hip. Notices a difference     OBJECTIVE:  Current objective measures:             Therapeutic Exercises (CPT 94068):     1. bridge hold, 4 x 1 min    2. Fig 4 bridge, 2 x 15 B, 3 sec hold    4. SL hip abd/ext hold, unable without comp on Rt LE    5. SL clamshell hold, 3 x 1 min B    6. ball birdge hold, 6 x fatigue (46, 60, 62, 48. 29,    7. split STS Rt LE bias, x 8, 19\" table CGA-min A, mod cues to maintain inc wb on Rt LE and dec anterio ws to left LE    20. 4/3-5/22      Therapeutic Exercise Summary:     HEP: 1x/day   - R gastroc stretch 3 x1 min  - Sidelying hip abduction 3x10 reps  - side shuffle steps 3x5 reps (1 rep = down and back kitchen counter)  Access Code: [email or web address]  URL: [email or web address]  Date: 04/21/2025  Prepared by:     Exercises  - Supine Bridge  - 1 x daily - 7 x weekly - 4 reps - 1 min hold  - Figure 4 Bridge  - 1 x daily - 7 x weekly - 3 sets - 15 reps - 3-5 hold  - Clamshell  - 1 x daily - 7 x weekly - 3 reps - 1 min hold      Therapeutic Treatments and Modalities:     Therapeutic Treatment and Modalities Summary:      Time-based treatments/modalities:    Physical Therapy Timed Treatment Charges  Therapeutic exercise minutes (CPT 94011): 41 minutes    ASSESSMENT:   Response to treatment:   Emphasis on glute strength and endurance training for this session with inc difficulty noted with endurance holds. Education on importance of improving endurance/strength in relation to his gait deficits noted " when hiking/carrying a load.     PLAN/RECOMMENDATIONS:   Plan for treatment: therapy treatment to continue next visit.  Planned interventions for next visit: continue with current treatment.      Minibest

## 2025-04-23 ENCOUNTER — PHYSICAL THERAPY (OUTPATIENT)
Dept: PHYSICAL THERAPY | Facility: REHABILITATION | Age: 45
End: 2025-04-23
Attending: FAMILY MEDICINE
Payer: MEDICARE

## 2025-04-23 DIAGNOSIS — I69.398 SPASTICITY DUE TO OLD STROKE: ICD-10-CM

## 2025-04-23 DIAGNOSIS — G81.90 HEMIPLEGIA AFFECTING DOMINANT SIDE (HCC): ICD-10-CM

## 2025-04-23 DIAGNOSIS — R25.2 SPASTICITY DUE TO OLD STROKE: ICD-10-CM

## 2025-04-23 PROCEDURE — 97112 NEUROMUSCULAR REEDUCATION: CPT

## 2025-04-23 NOTE — OP THERAPY DAILY TREATMENT
Outpatient Physical Therapy  DAILY TREATMENT     [facility] Physical [address]  [address]  [place] [address]  Phone:  [phone]  Fax:  [phone]    Date: 04/23/2025    Patient: Fritz Ruggiero  YOB: 1980  MRN: [id number]     Time Calculation    Start time: 0831  Stop time: 0912 Time Calculation (min): 41 minutes         Chief Complaint: Loss Of Balance    Visit #: 6    SUBJECTIVE:  Has been practicing gait changes with walking-knee flexion then hip. Notices a difference     OBJECTIVE:  Current objective measures:             Therapeutic Exercises (CPT 21025):     20. 4/3-5/22      Therapeutic Exercise Summary:     HEP: 1x/day   - R gastroc stretch 3 x1 min  - Sidelying hip abduction 3x10 reps  - side shuffle steps 3x5 reps (1 rep = down and back kitchen counter)  Access Code: [email or web address]  URL: [email or web address]  Date: 04/21/2025  Prepared by:     Exercises  - Supine Bridge  - 1 x daily - 7 x weekly - 4 reps - 1 min hold  - Figure 4 Bridge  - 1 x daily - 7 x weekly - 3 sets - 15 reps - 3-5 hold  - Clamshell  - 1 x daily - 7 x weekly - 3 reps - 1 min hold  Access Code: [email or web address]  URL: [email or web address]  Date: 04/23/2025  Prepared by:     Exercises  - Side Plank on Knees  - 1 x daily - 7 x weekly - 3 sets - 3-5 reps - 30-60 hold  - Hip flexion with knee bent  - 1 x daily - 7 x weekly - 3 sets - 10 reps    Therapeutic Treatments and Modalities:     1. Neuromuscular Re-education (CPT 66856)    Therapeutic Treatment and Modalities Summary:   For improving knee flexion control during swing performed supine/modified chuck positon with Rt LE off the table. VC to maintain knee flexion while moving hip into flexion back onto table   Performed 2 x 10 5# DB for inc motor recruitment. 25% or reps mod A to facilitate knee flexion. Use of table for external cue for ext  Last set no AW with good overall carry over and quality    Further progressed intensity of  functional intervention with standing knee flexion/hip flexion Rt LE in terminal swing in front of stairs, cued to bend knee then advance hip to LE on second step, then maintain flexion and return to terminal swing. First 5 reps mod A, use of external feedback if lE drag or toes catch on step. Performed x 15, inc intensity/recruitment with 5# AW x 15 reps then performed another 10 for carry over no AW with good carry over and quality of movement.    For carry over to functional gait training ambulated 200 feet, pre cues for knee flexion in swing with no cues during improved swing less than 25% of reps but did note dec vaulting overall    Rt side plank isos for inc stabilty while ambulating 4 x 30 sec, unable to clear Rt side, left x 1 min hold good clearance     Time-based treatments/modalities:    Physical Therapy Timed Treatment Charges  Neuromusc re-ed, balance, coor, post minutes (CPT 69141): 41 minutes    ASSESSMENT:   Response to treatment:   Emphasis on improving knee flexion in swing to improve stabilty and gait mechanics while hiking. Good carry over between interventions however limited thus far in changes with ambulation. Added side planks and supine hip flex/knee flexion lifts to HEP    PLAN/RECOMMENDATIONS:   Plan for treatment: therapy treatment to continue next visit.  Planned interventions for next visit: continue with current treatment.

## 2025-04-29 ENCOUNTER — PHYSICAL THERAPY (OUTPATIENT)
Dept: PHYSICAL THERAPY | Facility: REHABILITATION | Age: 45
End: 2025-04-29
Attending: FAMILY MEDICINE
Payer: MEDICARE

## 2025-04-29 DIAGNOSIS — I69.398 SPASTICITY DUE TO OLD STROKE: ICD-10-CM

## 2025-04-29 DIAGNOSIS — G81.90 HEMIPLEGIA AFFECTING DOMINANT SIDE (HCC): ICD-10-CM

## 2025-04-29 DIAGNOSIS — R25.2 SPASTICITY DUE TO OLD STROKE: ICD-10-CM

## 2025-04-29 PROCEDURE — 97112 NEUROMUSCULAR REEDUCATION: CPT

## 2025-04-29 PROCEDURE — 97110 THERAPEUTIC EXERCISES: CPT

## 2025-04-29 NOTE — OP THERAPY PROGRESS SUMMARY
Outpatient Physical Therapy  PROGRESS SUMMARY NOTE      [facility] Physical Therapy [address].  [address]  [place] [address]  Phone:  [phone]  Fax:  [phone]    Date of Visit: 04/29/2025    Patient: Fritz Ruggiero  YOB: 1980  MRN: [id number]     Referring Provider: Brittny Marte M.D.  [address]  [place] [address]   Referring Diagnosis Other sequelae of cerebral infarction [I69.398];Cramp and spasm [R25.2];Hemiplegia, unspecified affecting unspecified side [G81.90]     Visit Diagnoses     ICD-10-CM   1. Spasticity due to old stroke  I69.398    R25.2   2. Hemiplegia affecting dominant side (HCC)  G81.90       Rehab Potential: good    Progress Report Period: 4/3/25-4/29/25    Functional Assessment Used      Minibest,6MWT    Objective Findings and Assessment:   Patient progression towards goals: Pt has completed 6 follow ups since evaluation for strength, balance and gait mechanics deficits secondary to chronic CVA impacting function. Pt has demod good progress towards goals as well as new functional assessments and goals being established today more specific to functional LE strength and balance including minibest and progressing towards hiking/back packing. He will cont to benefit from skilled PT 1-2x 6 weeks to dec fall risk and maximize function during hobbies such as hiking.     Short Term Goals:   1. Pt will be independent with HEP 3-5x per week for improving strength, ROM and decreasing pain  2. Pt will complete most appropriate balance outcome measure met  Short term goal time span:  6-8 weeks      Long Term Goals:    1. Pt will demo improved 5 time sit to stand with in MCID DC  2. Pt will demo improved balance scoring low fall risk on most appropriate outcome measure cont  3. Pt will demo proper form with gym related activities to ensure safety DC  NEW: Pt will report ability to ambulate while carrying backpack on uneven terrain without LOB with  UE supp as necessary to return to backpacking.  NEW: Pt will demo improved Rt glute strength with Rr side plank knees bent with UE supp x 5 sec       Goals:   Short Term Goals:   1. Pt will be independent with HEP 3-5x per week for improving strength, ROM and decreasing pain  Short term goal time span:  1-2 weeks      Long Term Goals:    1. Pt will demo improved balance scoring low fall risk on most appropriate outcome measure cont  2. Pt will report ability to ambulate while carrying backpack on uneven terrain without LOB with UE supp as necessary to return to backpacking.  3. Pt will demo improved Rt glute strength with Rr side plank knees bent with UE supp x 5 sec   Long term goal time span:  4-6 weeks    Plan:   Planned therapy interventions:  Gait Training (CPT 48739), Therapeutic Activities (CPT 62054), Therapeutic Exercise (CPT 73620) and Neuromuscular Re-education (CPT 99080)  Frequency:  2x week  Duration in weeks:  6      Referring provider co-signature:  I have reviewed this plan of care and my co-signature certifies the need for services.     Certification Period: 04/29/2025 to 06/10/25    Physician Signature: ________________________________ Date: ______________

## 2025-04-29 NOTE — OP THERAPY DAILY TREATMENT
Outpatient Physical Therapy  DAILY TREATMENT     [facility] Physical [address].  [address]  [place] [address]  Phone:  [phone]  Fax:  [phone]    Date: 2025    Patient: Fritz Ruggiero  YOB: 1980  MRN: [id number]     Time Calculation    Start time: 09  Stop time: 0958 Time Calculation (min): 42 minutes         Chief Complaint: Weakness and Loss Of Balance    Visit #: 7    SUBJECTIVE:  Has not been backpacking yet. Feeling a little more stable on a few outdoor walks he went on    OBJECTIVE:  Current objective measures:             5STS 11.39 no UE  TUG no AD 12.30  Tug Cog 18.86 (cannot do counting) (naming states)  6MWT no  feet, 2/10 mod RPE cues for left UE swing      MiniBest Test    Anticipatory  STS:2  Rise to Toes:0  SLS: Left 1.20 2.  Right. 1. 22.3 score 1   Subscore 3 /6    Reactive Postural Control  Compensatory Stepping Correction-Forward: 1  Compensatory Stepping Correction-Backwards:1  Compensatory Stepping Correction-Lateral: Left: 1 Right:1  Subscore 3/6    Sensory Orientation:  Stance (feet together) Eyes open, Firm surface:2  Stance (feet together) Eyes closed, Foam Surface:1  Incline -eyes closed:2  Subscore 5 /6    Dynamic Gait  Changes in gait speed:2  Walk with head turns-horizontal:1  Walk with pivot turns:2  Step over obstacles:1  TUG:  Tug CO stopped the task    Subscore 6 /10    17/28    Less than 21 indicates increased risk of falling     Rt side plank: 2-3 sec knees bent excessive left UE, without left UE unable    Therapeutic Exercises (CPT 98870):     1. side planks, 3 x fatigue    2. SL bridge, 2 x 10 B    3. 6MWT 5STS    20. 4/3-      Therapeutic Exercise Summary:     HEP: 1x/day   - R gastroc stretch 3 x1 min  - Sidelying hip abduction 3x10 reps  - side shuffle steps 3x5 reps (1 rep = down and back kitchen counter)  Access Code: [email or web address]  URL: [email or web address]  Date: 2025  Prepared by:      Exercises  - Supine Bridge  - 1 x daily - 7 x weekly - 4 reps - 1 min hold  - Figure 4 Bridge  - 1 x daily - 7 x weekly - 3 sets - 15 reps - 3-5 hold  - Clamshell  - 1 x daily - 7 x weekly - 3 reps - 1 min hold  Access Code: [email or web address]  URL: [email or web address]  Date: 04/23/2025  Prepared by:     Exercises  - Side Plank on Knees  - 1 x daily - 7 x weekly - 3 sets - 3-5 reps - 30-60 hold  - Hip flexion with knee bent  - 1 x daily - 7 x weekly - 3 sets - 10 reps    Therapeutic Treatments and Modalities:     1. Neuromuscular Re-education (CPT 37644)    Therapeutic Treatment and Modalities Summary: SMART goal setting. Goal reassessment    Minibest see objective  HEP review-start walking outdoors with empty back pack  Education on OT referral near/around botox injections to maximize effectiveness    Time-based treatments/modalities:    Physical Therapy Timed Treatment Charges  Neuromusc re-ed, balance, coor, post minutes (CPT 77297): 25 minutes  Therapeutic exercise minutes (CPT 26239): 17 minutes    ASSESSMENT:   Response to treatment:     Pt has completed 6 follow ups since evaluation for strength, balance and gait mechanics deficits secondary to chronic CVA impacting function. Pt has demod good progress towards goals as well as new functional assessments and goals being established today more specific to functional LE strength and balance including minibest and progressing towards hiking/back packing. He will cont to benefit from skilled PT 1-2x 6 weeks to dec fall risk and maximize function during hobbies such as hiking.     Short Term Goals:   1. Pt will be independent with HEP 3-5x per week for improving strength, ROM and decreasing pain  2. Pt will complete most appropriate balance outcome measure met  Short term goal time span:  6-8 weeks      Long Term Goals:    1. Pt will demo improved 5 time sit to stand with in MCID DC  2. Pt will demo improved balance scoring low fall risk on most appropriate  outcome measure cont  3. Pt will demo proper form with gym related activities to ensure safety DC  NEW: Pt will report ability to ambulate while carrying backpack on uneven terrain without LOB with UE supp as necessary to return to backpacking.  NEW: Pt will demo improved Rt glute strength with Rr side plank knees bent with UE supp x 5 sec     PLAN/RECOMMENDATIONS:   Plan for treatment: therapy treatment to continue next visit.  Planned interventions for next visit: continue with current treatment.    Hiking with pack

## 2025-04-30 ENCOUNTER — APPOINTMENT (OUTPATIENT)
Dept: PHYSICAL THERAPY | Facility: REHABILITATION | Age: 45
End: 2025-04-30
Attending: FAMILY MEDICINE
Payer: MEDICARE

## 2025-04-30 NOTE — OP THERAPY DAILY TREATMENT
Outpatient Physical Therapy  DAILY TREATMENT     [facility] Physical [address]  [address]  [place] [address]  Phone:  [phone]  Fax:  [phone]    Date: 2025    Patient: Fritz Ruggiero  YOB: 1980  MRN: [id number]     Time Calculation    Start time: 0830  Stop time: 0912 Time Calculation (min): 42 minutes         Chief Complaint: Weakness and Loss Of Balance    Visit #: 8    SUBJECTIVE:  Has not been backpacking yet. Feeling a little more stable on a few outdoor walks he went on    OBJECTIVE:  Current objective measures:             5STS 11.39 no UE  TUG no AD 12.30  Tug Cog 18.86 (cannot do counting) (naming states)  6MWT no  feet, 2/10 mod RPE cues for left UE swing      MiniBest Test    Anticipatory  STS:2  Rise to Toes:0  SLS: Left 1.20 2.  Right. 1. 22.3 score 1   Subscore 3 /6    Reactive Postural Control  Compensatory Stepping Correction-Forward: 1  Compensatory Stepping Correction-Backwards:1  Compensatory Stepping Correction-Lateral: Left: 1 Right:1  Subscore 3/6    Sensory Orientation:  Stance (feet together) Eyes open, Firm surface:2  Stance (feet together) Eyes closed, Foam Surface:1  Incline -eyes closed:2  Subscore 5 /6    Dynamic Gait  Changes in gait speed:2  Walk with head turns-horizontal:1  Walk with pivot turns:2  Step over obstacles:1  TUG:  Tug CO stopped the task    Subscore 6 /10    17/28    Less than 21 indicates increased risk of falling     Rt side plank: 2-3 sec knees bent excessive left UE, without left UE unable    Therapeutic Exercises (CPT 46404):     20. 4/3-      Therapeutic Exercise Summary:     HEP: 1x/day   - R gastroc stretch 3 x1 min  - Sidelying hip abduction 3x10 reps  - side shuffle steps 3x5 reps (1 rep = down and back kitchen counter)  Access Code: [email or web address]  URL: [email or web address]  Date: 2025  Prepared by:     Exercises  - Supine Bridge  - 1 x daily - 7 x weekly - 4 reps - 1  "min hold  - Figure 4 Bridge  - 1 x daily - 7 x weekly - 3 sets - 15 reps - 3-5 hold  - Clamshell  - 1 x daily - 7 x weekly - 3 reps - 1 min hold  Access Code: [email or web address]  URL: [email or web address]  Date: 04/23/2025  Prepared by:     Exercises  - Side Plank on Knees  - 1 x daily - 7 x weekly - 3 sets - 3-5 reps - 30-60 hold  - Hip flexion with knee bent  - 1 x daily - 7 x weekly - 3 sets - 10 reps    Therapeutic Treatments and Modalities:     1. Neuromuscular Re-education (CPT 00834)    Therapeutic Treatment and Modalities Summary: [place] carry 20# left UE 6 min mod difficulty    For improving core stabilty  Ball bridge 4 x fatigue (1:20, 1:30, 1:50    Ball bridge c HS curl 3 x 10, cues for core engagement. Education on relation to gait mechanics with hip ext and knee flexion. Able to perform +encouraged for HEP    Modified SLS 6\" Rt LE bias only x 2 min each, horiz and vert HT, EC, EC horizt HT    Modified SLS toe touch WB x 3 min total attempts, cues dec trunk comp, SBA    Rt SLS x 3 min    Rt LE bias STS CGA x 8        Time-based treatments/modalities:    Physical Therapy Timed Treatment Charges  Neuromusc re-ed, balance, coor, post minutes (CPT 91897): 42 minutes    ASSESSMENT:   Response to treatment:   Discussed dec POC 1 x a week with pt cont emphasis on strength training at gym, pt verbalized agreement. Cont to progress HEP. Pt feelings of instabilty able to be reproduced with weighted carry, likely glute     PLAN/RECOMMENDATIONS:   Plan for treatment: therapy treatment to continue next visit.  Planned interventions for next visit: continue with current treatment.    Hiking with pack        "

## 2025-05-01 ENCOUNTER — PHYSICAL THERAPY (OUTPATIENT)
Dept: PHYSICAL THERAPY | Facility: REHABILITATION | Age: 45
End: 2025-05-01
Attending: FAMILY MEDICINE
Payer: MEDICARE

## 2025-05-01 DIAGNOSIS — R25.2 SPASTICITY DUE TO OLD STROKE: ICD-10-CM

## 2025-05-01 DIAGNOSIS — G81.90 HEMIPLEGIA AFFECTING DOMINANT SIDE (HCC): ICD-10-CM

## 2025-05-01 DIAGNOSIS — I69.398 SPASTICITY DUE TO OLD STROKE: ICD-10-CM

## 2025-05-01 PROCEDURE — 97112 NEUROMUSCULAR REEDUCATION: CPT

## 2025-05-05 ENCOUNTER — APPOINTMENT (OUTPATIENT)
Dept: PHYSICAL THERAPY | Facility: REHABILITATION | Age: 45
End: 2025-05-05
Attending: FAMILY MEDICINE
Payer: MEDICARE

## 2025-05-07 ENCOUNTER — APPOINTMENT (OUTPATIENT)
Dept: MEDICAL GROUP | Facility: PHYSICIAN GROUP | Age: 45
End: 2025-05-07
Payer: MEDICARE

## 2025-05-07 ENCOUNTER — APPOINTMENT (OUTPATIENT)
Dept: PHYSICAL THERAPY | Facility: REHABILITATION | Age: 45
End: 2025-05-07
Attending: FAMILY MEDICINE
Payer: MEDICARE

## 2025-05-08 ENCOUNTER — APPOINTMENT (OUTPATIENT)
Dept: MEDICAL GROUP | Facility: PHYSICIAN GROUP | Age: 45
End: 2025-05-08
Payer: MEDICARE

## 2025-05-08 VITALS
HEIGHT: 68 IN | DIASTOLIC BLOOD PRESSURE: 62 MMHG | WEIGHT: 137.2 LBS | BODY MASS INDEX: 20.79 KG/M2 | TEMPERATURE: 97.9 F | OXYGEN SATURATION: 97 % | SYSTOLIC BLOOD PRESSURE: 96 MMHG | HEART RATE: 86 BPM

## 2025-05-08 DIAGNOSIS — I69.391 DYSPHAGIA AS LATE EFFECT OF CEREBROVASCULAR ACCIDENT (CVA): ICD-10-CM

## 2025-05-08 DIAGNOSIS — R26.9 ABNORMALITY OF GAIT AS LATE EFFECT OF CEREBROVASCULAR ACCIDENT (CVA): ICD-10-CM

## 2025-05-08 DIAGNOSIS — G81.90 HEMIPLEGIA AFFECTING DOMINANT SIDE (HCC): ICD-10-CM

## 2025-05-08 DIAGNOSIS — I69.398 SPASTICITY DUE TO OLD STROKE: ICD-10-CM

## 2025-05-08 DIAGNOSIS — R25.2 SPASTICITY DUE TO OLD STROKE: ICD-10-CM

## 2025-05-08 DIAGNOSIS — R73.03 PREDIABETES: ICD-10-CM

## 2025-05-08 DIAGNOSIS — I69.398 ABNORMALITY OF GAIT AS LATE EFFECT OF CEREBROVASCULAR ACCIDENT (CVA): ICD-10-CM

## 2025-05-08 DIAGNOSIS — I69.392 FACIAL WEAKNESS AS LATE EFFECT OF CEREBROVASCULAR ACCIDENT (CVA): ICD-10-CM

## 2025-05-08 PROCEDURE — 3078F DIAST BP <80 MM HG: CPT | Performed by: FAMILY MEDICINE

## 2025-05-08 PROCEDURE — 3074F SYST BP LT 130 MM HG: CPT | Performed by: FAMILY MEDICINE

## 2025-05-08 PROCEDURE — 99213 OFFICE O/P EST LOW 20 MIN: CPT | Performed by: FAMILY MEDICINE

## 2025-05-08 ASSESSMENT — FIBROSIS 4 INDEX: FIB4 SCORE: 1.42

## 2025-05-08 NOTE — PROGRESS NOTES
"Verbal consent was acquired by the patient to use [employer] ambient listening note generation during this visit.    Subjective:     HPI:   History of Present Illness  The patient presents for evaluation of spasticity and prediabetes.    She is currently undergoing physical therapy, which she reports as beneficial. The frequency of her sessions is scheduled to decrease to once weekly starting next week. The primary focus during these sessions is on strength training and hip strengthening exercises. She experiences myalgia in her legs every other day. Her therapist has suggested that botulinum toxin injections may be beneficial for her spasticity, a recommendation she is considering. She recalls a previous experience with procaine injections, which provided temporary relief but were not pursued further due to their short duration of effect. She continues to take mirabegron and baclofen as needed, with an increased frequency during her physical therapy sessions.    Her hemoglobin A1c was 5.8% in February 2025.    Health Maintenance: Completed    Objective:     Exam:  BP 96/62 (BP Location: Right arm, Patient Position: Sitting, BP Cuff Size: Adult)   Pulse 86   Temp 36.6 °C (97.9 °F) (Temporal)   Ht 1.727 m (5' 8\")   Wt 62.2 kg (137 lb 3.2 oz)   SpO2 97%   BMI 20.86 kg/m²  Body mass index is 20.86 kg/m².    Physical Exam  Vitals reviewed.   Constitutional:       Appearance: Normal appearance.   HENT:      Head: Normocephalic and atraumatic.      Nose: Nose normal.   Cardiovascular:      Rate and Rhythm: Normal rate and regular rhythm.      Pulses: Normal pulses.      Heart sounds: Normal heart sounds. No murmur heard.  Pulmonary:      Effort: Pulmonary effort is normal.      Breath sounds: Normal breath sounds.   Abdominal:      General: Abdomen is flat.      Palpations: Abdomen is soft.   Skin:     General: Skin is warm and dry.   Neurological:      Mental Status: He is alert and oriented to person, place, and " time. Mental status is at baseline.      Motor: Weakness present.      Gait: Gait abnormal.   Psychiatric:         Mood and Affect: Mood normal.         Behavior: Behavior normal.             Results  Labs   - A1c: 02/2025, 5.8   - Electrolytes: 02/2025, Normal   - Kidney function: 02/2025, Normal   - Liver function: 02/2025, Normal   - Cholesterol: 02/2025, Normal   - Thyroid: 02/2025, Normal   - Complete Blood Count: 02/2025, Normal    Assessment & Plan:     1. Hemiplegia affecting dominant side (HCC)  Referral to Neurology      2. Facial weakness as late effect of cerebrovascular accident (CVA)  Referral to Neurology      3. Abnormality of gait as late effect of cerebrovascular accident (CVA)  Referral to Neurology      4. Dysphagia as late effect of cerebrovascular accident (CVA)  Referral to Neurology      5. Spasticity due to old stroke  Referral to Neurology      6. Prediabetes            Assessment & Plan  1. Spasticity.  - She is currently undergoing physical therapy, which is progressing well.  - The therapist has recommended reducing sessions to once a week starting next week.  - Botox injections have been suggested by the therapist to help with spasticity.  - A referral to Dr. Khan for Botox injections will be made.    2. Prediabetes.  - Her A1c level was 5.8 in 02/2025, indicating prediabetes.  - She is advised to avoid sweets, sugary beverages, and monitor her carbohydrate intake.  - A repeat A1c test will be scheduled for 08/2025 to monitor progress.  - No medication is required at this point.          Return if symptoms worsen or fail to improve.    Please note that this dictation was created using voice recognition software. I have made every reasonable attempt to correct obvious errors, but I expect that there are errors of grammar and possibly content that I did not discover before finalizing the note.    Brittny Marte MD  Family Medicine and Non - Operative Sports Medicine   [facility]  - [place]

## 2025-05-12 ENCOUNTER — PHYSICAL THERAPY (OUTPATIENT)
Dept: PHYSICAL THERAPY | Facility: REHABILITATION | Age: 45
End: 2025-05-12
Attending: FAMILY MEDICINE
Payer: MEDICARE

## 2025-05-12 DIAGNOSIS — I69.398 SPASTICITY DUE TO OLD STROKE: ICD-10-CM

## 2025-05-12 DIAGNOSIS — G81.90 HEMIPLEGIA AFFECTING DOMINANT SIDE (HCC): ICD-10-CM

## 2025-05-12 DIAGNOSIS — R25.2 SPASTICITY DUE TO OLD STROKE: ICD-10-CM

## 2025-05-12 PROCEDURE — 97110 THERAPEUTIC EXERCISES: CPT

## 2025-05-12 PROCEDURE — 97112 NEUROMUSCULAR REEDUCATION: CPT

## 2025-05-12 NOTE — OP THERAPY DAILY TREATMENT
Outpatient Physical Therapy  DAILY TREATMENT     [facility] Physical [address].  [address]  [place] [address]  Phone:  [phone]  Fax:  [phone]    Date: 2025    Patient: Fritz Ruggiero  YOB: 1980  MRN: [id number]     Time Calculation    Start time: 1500  Stop time: 1541 Time Calculation (min): 41 minutes         Chief Complaint: Weakness and Loss Of Balance    Visit #: 9    SUBJECTIVE:  Has not been backpacking yet. Has been working on LE and glute strength at the gym    OBJECTIVE:  Current objective measures:             5STS 11.39 no UE  TUG no AD 12.30  Tug Cog 18.86 (cannot do counting) (naming states)  6MWT no  feet, 2/10 mod RPE cues for left UE swing      MiniBest Test    Anticipatory  STS:2  Rise to Toes:0  SLS: Left 1.20 2.  Right. 1. 22.3 score 1   Subscore 3 /6    Reactive Postural Control  Compensatory Stepping Correction-Forward: 1  Compensatory Stepping Correction-Backwards:1  Compensatory Stepping Correction-Lateral: Left: 1 Right:1  Subscore 3/6    Sensory Orientation:  Stance (feet together) Eyes open, Firm surface:2  Stance (feet together) Eyes closed, Foam Surface:1  Incline -eyes closed:2  Subscore 5 /6    Dynamic Gait  Changes in gait speed:2  Walk with head turns-horizontal:1  Walk with pivot turns:2  Step over obstacles:1  TUG:  Tug CO stopped the task    Subscore  /10    /    Less than 21 indicates increased risk of falling     Rt side plank: 2-3 sec knees bent excessive left UE, without left UE unable    Therapeutic Exercises (CPT 11006):     1. shuttle squats *power cues up, slow ecc down, 4 x 20, 5 cords    2. shuttle SL squat *power cues up, ecc down, 3 x 15 B, 3 cords    20. /3-      Therapeutic Exercise Summary:     HEP: 1x/day   - R gastroc stretch 3 x1 min  - Sidelying hip abduction 3x10 reps  - side shuffle steps 3x5 reps (1 rep = down and back kitchen counter)  Access Code: [email or web address]  URL:  [email or web address]  Date: 04/21/2025  Prepared by:     Exercises  - Supine Bridge  - 1 x daily - 7 x weekly - 4 reps - 1 min hold  - Figure 4 Bridge  - 1 x daily - 7 x weekly - 3 sets - 15 reps - 3-5 hold  - Clamshell  - 1 x daily - 7 x weekly - 3 reps - 1 min hold  Access Code: [email or web address]  URL: [email or web address]  Date: 04/23/2025  Prepared by:     Exercises  - Side Plank on Knees  - 1 x daily - 7 x weekly - 3 sets - 3-5 reps - 30-60 hold  - Hip flexion with knee bent  - 1 x daily - 7 x weekly - 3 sets - 10 reps    Therapeutic Treatments and Modalities:     1. Neuromuscular Re-education (CPT 99190)    Therapeutic Treatment and Modalities Summary:   For improving LE coordination and stabilty performed shuttle jumps landing BLE, emphasis on equal wb and controled landing, 3 cords x 4 min, rests as needed, improved performance noted  Further progressed for SL coordination with bounding/SL landing alternating LE 3 cords x 4 min    Modified SLS on BOSU ball side up x 2 min each, UE as needed SPV    BOSU ball mini squats UE touch A as needed 2 x 12 cues for hip hinge with good carry over reps    BOSU ball balance normal JOAQUIN horiz and vert HT x 2 min each    Time-based treatments/modalities:    Physical Therapy Timed Treatment Charges  Neuromusc re-ed, balance, coor, post minutes (CPT 70406): 24 minutes  Therapeutic exercise minutes (CPT 78639): 17 minutes    ASSESSMENT:   Response to treatment:     Inc difficulty with Rt LE coordination performing shuttle jumps but demod improved performance with reps and cues. Cont to rely heavily on left LE with BOSU squats and inc ankle strategy noted on left on BOSU as well with limited change likely due to chronic condition x 20 years. Encouraged bosu ball balance interventions at gym with UE as needed, verbalized understanding.  PLAN/RECOMMENDATIONS:   Plan for treatment: therapy treatment to continue next visit.  Planned interventions for next visit: continue  with current treatment.    Hiking with pack    BOSU ball balance HEP

## 2025-05-13 ENCOUNTER — APPOINTMENT (OUTPATIENT)
Dept: PHYSICAL THERAPY | Facility: REHABILITATION | Age: 45
End: 2025-05-13
Payer: MEDICARE

## 2025-05-14 ENCOUNTER — APPOINTMENT (OUTPATIENT)
Dept: PHYSICAL THERAPY | Facility: REHABILITATION | Age: 45
End: 2025-05-14
Attending: FAMILY MEDICINE
Payer: MEDICARE

## 2025-05-15 ENCOUNTER — APPOINTMENT (OUTPATIENT)
Dept: PHYSICAL THERAPY | Facility: REHABILITATION | Age: 45
End: 2025-05-15
Payer: MEDICARE

## 2025-05-19 ENCOUNTER — APPOINTMENT (OUTPATIENT)
Dept: PHYSICAL THERAPY | Facility: REHABILITATION | Age: 45
End: 2025-05-19
Attending: FAMILY MEDICINE
Payer: MEDICARE

## 2025-05-20 ENCOUNTER — PHYSICAL THERAPY (OUTPATIENT)
Dept: PHYSICAL THERAPY | Facility: REHABILITATION | Age: 45
End: 2025-05-20
Attending: FAMILY MEDICINE
Payer: MEDICARE

## 2025-05-20 DIAGNOSIS — R25.2 SPASTICITY DUE TO OLD STROKE: Primary | ICD-10-CM

## 2025-05-20 DIAGNOSIS — I69.398 SPASTICITY DUE TO OLD STROKE: Primary | ICD-10-CM

## 2025-05-20 DIAGNOSIS — G81.90 HEMIPLEGIA AFFECTING DOMINANT SIDE (HCC): ICD-10-CM

## 2025-05-20 PROCEDURE — 97110 THERAPEUTIC EXERCISES: CPT

## 2025-05-20 PROCEDURE — 97112 NEUROMUSCULAR REEDUCATION: CPT

## 2025-05-20 NOTE — OP THERAPY DISCHARGE SUMMARY
Outpatient Physical Therapy  DISCHARGE SUMMARY NOTE      [address]  [address]  [place] [address]  Phone:  [phone]  Fax:  [phone]    Date of Visit: 05/20/2025    Patient: Fritz Ruggiero  YOB: 1980  MRN: [id number]     Referring Provider: Brittny Marte M.D.  [address]  [place] [address]   Referring Diagnosis Other sequelae of cerebral infarction [I69.398];Cramp and spasm [R25.2];Hemiplegia, unspecified affecting unspecified side [G81.90]         Functional Assessment Used        Your patient is being discharged from Physical Therapy with the following comments:   Goals met    Comments:  Pt has completed 3 follow ups since OPN for balance and strength deficits impacting function specifically impacting pt ability to hike in the community without LOB. Pt has made excellent overall progress towards goals as noted with improvement on minibest score 22/28 as well as improved glute strength noted with side plank improvements. Pt has demonstrated excellnt understanding of HEP for strengthening as well as balance HEP and will cont to make good improvement with HEP. At this time most goals have been met and is appropriate to DC. Pt verbalized agreement for DC. Only goal not met related to subjective report of improved hiking but pt has not tried during the POC due to weather and other factors.    Short Term Goals:   1. Pt will be independent with HEP 3-5x per week for improving strength, ROM and decreasing pain met  Short term goal time span:  1-2 weeks      Long Term Goals:    1. Pt will demo improved balance scoring low fall risk on most appropriate outcome measure met  2. Pt will report ability to ambulate while carrying backpack on uneven terrain without LOB with UE supp as necessary to return to backpacking. DC  3. Pt will demo improved Rt glute strength with Rr side plank knees bent with UE supp x 5 sec met  Long term goal time  span:  4-6 weeks  Joaquin Cortez, PT, DPT    Date: 5/20/2025

## 2025-05-20 NOTE — OP THERAPY DAILY TREATMENT
Outpatient Physical Therapy  DAILY TREATMENT     [facility] Physical [address].  [address]  [place] [address]  Phone:  [phone]  Fax:  [phone]    Date: 2025    Patient: Fritz Ruggiero  YOB: 1980  MRN: [id number]     Time Calculation    Start time: 0830  Stop time: 0911 Time Calculation (min): 41 minutes         Chief Complaint: Loss Of Balance and Weakness    Visit #: 10    SUBJECTIVE:  Had neck pain about 2 days after last session and didn't do HEP. Still has not tried hiking.Tried some BOSU balance at the gym. Agreeable to DC    OBJECTIVE:  Current objective measures:             5STS 11.39 no UE  TUG no AD 12.30  Tug Cog 18.86 (cannot do counting) (naming states)  6MWT no  feet, 2/10 mod RPE cues for left UE swing      MiniBest Test    Anticipatory  STS:2  Rise to Toes:0  SLS: Left 1.20 2.  Right. 1. 22.3 score 1   Subscore 3 /6    Reactive Postural Control  Compensatory Stepping Correction-Forward: 1  Compensatory Stepping Correction-Backwards:1  Compensatory Stepping Correction-Lateral: Left: 1 Right:1  Subscore 3/6    Sensory Orientation:  Stance (feet together) Eyes open, Firm surface:2  Stance (feet together) Eyes closed, Foam Surface:1  Incline -eyes closed:2  Subscore 5 /6    Dynamic Gait  Changes in gait speed:2  Walk with head turns-horizontal:1  Walk with pivot turns:2  Step over obstacles:1  TUG:  Tug CO stopped the task    Subscore 6 /10    17/28    Less than 21 indicates increased risk of falling     Rt side plank: 2-3 sec knees bent excessive left UE, without left UE unable        Anticipatory  STS:2  Rise to Toes:1  SLS: Left 1.20 2.  Right. 1. 22.3 score 1   Subscore 4 /6     Reactive Postural Control  Compensatory Stepping Correction-Forward: 2  Compensatory Stepping Correction-Backwards:2  Compensatory Stepping Correction-Lateral: Left: 1 Right:1  Subscore 5/6     Sensory Orientation:  Stance (feet together) Eyes  open, Firm surface:2  Stance (feet together) Eyes closed, Foam Surface:2  Incline -eyes closed:2  Subscore 6 /6     Dynamic Gait  Changes in gait speed:2  Walk with head turns-horizontal:2  Walk with pivot turns:2  Step over obstacles:1  TUG:  Tug CO stopped the task     Subscore 7/10     22/28    Rt side plank; 8 sec. Lt: 1:10    Therapeutic Exercises (CPT 70875):     1. Goal reassessment    2. side plank see objecitive    3. verbal HEP review including oprogressions and regressions    . 4/3-      Therapeutic Exercise Summary:     HEP: 1x/day   - R gastroc stretch 3 x1 min  - Sidelying hip abduction 3x10 reps  - side shuffle steps 3x5 reps (1 rep = down and back kitchen counter)  Access Code: [email or web address]  URL: [email or web address]  Date: 2025  Prepared by:     Exercises  - Supine Bridge  - 1 x daily - 7 x weekly - 4 reps - 1 min hold  - Figure 4 Bridge  - 1 x daily - 7 x weekly - 3 sets - 15 reps - 3-5 hold  - Clamshell  - 1 x daily - 7 x weekly - 3 reps - 1 min hold  Access Code: [email or web address]  URL: [email or web address]  Date: 2025  Prepared by:     Exercises  - Side Plank on Knees  - 1 x daily - 7 x weekly - 3 sets - 3-5 reps - 30-60 hold  - Hip flexion with knee bent  - 1 x daily - 7 x weekly - 3 sets - 10 reps    Therapeutic Treatments and Modalities:     1. Neuromuscular Re-education (CPT 45466)    Therapeutic Treatment and Modalities Summary: Minibest see objective    Shuttle squat red attachment for unstable surface 4 cords 2 x 20  SL shuttle squat red attachment 3 x 15 3 cords    For improving LE coordination and stabilty performed shuttle jumps landing BLE, emphasis on equal wb and controled landing, 3 cords x 4 min, rests as needed, improved performance noted    Speed ladder for improving LE coordination, various drills x 6 min    Time-based treatments/modalities:    Physical Therapy Timed Treatment Charges  Neuromusc re-ed, balance, coor, post minutes (CPT 77947): 25  minutes  Therapeutic exercise minutes (CPT 00741): 16 minutes    ASSESSMENT:   Response to treatment:   Pt has completed 3 follow ups since OPN for balance and strength deficits impacting function specifically impacting pt ability to hike in the community without LOB. Pt has made excellent overall progress towards goals as noted with improvement on minibest score 22/28 as well as improved glute strength noted with side plank improvements. Pt has demonstrated excellnt understanding of HEP for strengthening as well as balance HEP and will cont to make good improvement with HEP. At this time most goals have been met and is appropriate to DC. Pt verbalized agreement for DC. Only goal not met related to subjective report of improved hiking but pt has not tried during the POC due to weather and other factors.    Short Term Goals:   1. Pt will be independent with HEP 3-5x per week for improving strength, ROM and decreasing pain met  Short term goal time span:  1-2 weeks      Long Term Goals:    1. Pt will demo improved balance scoring low fall risk on most appropriate outcome measure met  2. Pt will report ability to ambulate while carrying backpack on uneven terrain without LOB with UE supp as necessary to return to backpacking. DC  3. Pt will demo improved Rt glute strength with Rr side plank knees bent with UE supp x 5 sec met  Long term goal time span:  4-6 weeks       PLAN/RECOMMENDATIONS:   DC

## 2025-05-21 ENCOUNTER — APPOINTMENT (OUTPATIENT)
Dept: PHYSICAL THERAPY | Facility: REHABILITATION | Age: 45
End: 2025-05-21
Attending: FAMILY MEDICINE
Payer: MEDICARE

## 2025-05-23 ENCOUNTER — APPOINTMENT (OUTPATIENT)
Dept: PHYSICAL THERAPY | Facility: REHABILITATION | Age: 45
End: 2025-05-23
Payer: MEDICARE

## 2025-05-27 ENCOUNTER — APPOINTMENT (OUTPATIENT)
Dept: PHYSICAL THERAPY | Facility: REHABILITATION | Age: 45
End: 2025-05-27
Attending: FAMILY MEDICINE
Payer: MEDICARE

## 2025-05-27 ENCOUNTER — TELEPHONE (OUTPATIENT)
Dept: HEALTH INFORMATION MANAGEMENT | Facility: OTHER | Age: 45
End: 2025-05-27
Payer: MEDICARE

## 2025-05-28 ENCOUNTER — APPOINTMENT (OUTPATIENT)
Dept: PHYSICAL THERAPY | Facility: REHABILITATION | Age: 45
End: 2025-05-28
Attending: FAMILY MEDICINE
Payer: MEDICARE

## 2025-06-04 ENCOUNTER — APPOINTMENT (OUTPATIENT)
Dept: PHYSICAL THERAPY | Facility: REHABILITATION | Age: 45
End: 2025-06-04
Attending: FAMILY MEDICINE
Payer: MEDICARE

## 2025-06-11 ENCOUNTER — APPOINTMENT (OUTPATIENT)
Dept: PHYSICAL THERAPY | Facility: REHABILITATION | Age: 45
End: 2025-06-11
Attending: FAMILY MEDICINE
Payer: MEDICARE

## 2025-06-17 ENCOUNTER — APPOINTMENT (OUTPATIENT)
Dept: PHYSICAL THERAPY | Facility: REHABILITATION | Age: 45
End: 2025-06-17
Attending: FAMILY MEDICINE
Payer: MEDICARE

## 2025-06-25 ENCOUNTER — APPOINTMENT (OUTPATIENT)
Dept: PHYSICAL THERAPY | Facility: REHABILITATION | Age: 45
End: 2025-06-25
Attending: FAMILY MEDICINE
Payer: MEDICARE

## 2025-07-02 ENCOUNTER — APPOINTMENT (OUTPATIENT)
Dept: PHYSICAL THERAPY | Facility: REHABILITATION | Age: 45
End: 2025-07-02
Attending: FAMILY MEDICINE
Payer: MEDICARE

## 2025-07-09 ENCOUNTER — APPOINTMENT (OUTPATIENT)
Dept: PHYSICAL THERAPY | Facility: REHABILITATION | Age: 45
End: 2025-07-09
Attending: FAMILY MEDICINE
Payer: MEDICARE

## 2025-07-16 ENCOUNTER — APPOINTMENT (OUTPATIENT)
Dept: PHYSICAL THERAPY | Facility: REHABILITATION | Age: 45
End: 2025-07-16
Attending: FAMILY MEDICINE
Payer: MEDICARE

## 2025-07-23 ENCOUNTER — APPOINTMENT (OUTPATIENT)
Dept: PHYSICAL THERAPY | Facility: REHABILITATION | Age: 45
End: 2025-07-23
Attending: FAMILY MEDICINE
Payer: MEDICARE

## 2025-07-30 ENCOUNTER — APPOINTMENT (OUTPATIENT)
Dept: PHYSICAL THERAPY | Facility: REHABILITATION | Age: 45
End: 2025-07-30
Attending: FAMILY MEDICINE
Payer: MEDICARE

## 2025-07-31 ENCOUNTER — OFFICE VISIT (OUTPATIENT)
Dept: MEDICAL GROUP | Facility: PHYSICIAN GROUP | Age: 45
End: 2025-07-31
Payer: MEDICARE

## 2025-07-31 VITALS
SYSTOLIC BLOOD PRESSURE: 106 MMHG | OXYGEN SATURATION: 99 % | RESPIRATION RATE: 14 BRPM | BODY MASS INDEX: 19.82 KG/M2 | WEIGHT: 133.8 LBS | HEIGHT: 69 IN | HEART RATE: 61 BPM | DIASTOLIC BLOOD PRESSURE: 64 MMHG | TEMPERATURE: 98.6 F

## 2025-07-31 DIAGNOSIS — R73.03 PREDIABETES: ICD-10-CM

## 2025-07-31 DIAGNOSIS — I69.392 FACIAL WEAKNESS AS LATE EFFECT OF CEREBROVASCULAR ACCIDENT (CVA): ICD-10-CM

## 2025-07-31 DIAGNOSIS — R26.9 ABNORMALITY OF GAIT AS LATE EFFECT OF CEREBROVASCULAR ACCIDENT (CVA): ICD-10-CM

## 2025-07-31 DIAGNOSIS — I69.398 SPASTICITY DUE TO OLD STROKE: Primary | ICD-10-CM

## 2025-07-31 DIAGNOSIS — I69.398 ABNORMALITY OF GAIT AS LATE EFFECT OF CEREBROVASCULAR ACCIDENT (CVA): ICD-10-CM

## 2025-07-31 DIAGNOSIS — R25.2 SPASTICITY DUE TO OLD STROKE: Primary | ICD-10-CM

## 2025-07-31 LAB
HBA1C MFR BLD: 5.2 % (ref ?–5.8)
POCT INT CON NEG: NEGATIVE
POCT INT CON POS: POSITIVE

## 2025-07-31 ASSESSMENT — FIBROSIS 4 INDEX: FIB4 SCORE: 1.42

## 2025-07-31 NOTE — PROGRESS NOTES
"Verbal consent was acquired by the patient to use [employer] ambient listening note generation during this visit.    Subjective:     HPI:   History of Present Illness  The patient presents for completion of DMV paperwork.    He has successfully completed his physical therapy and is now independently utilizing the gym for exercise. His pharmacological regimen remains unchanged, and he does not require any assistive devices, except for a steering knob in his vehicle and a left foot gas pedal. He continues to experience spasticity in his right side. His exercise routine includes ball exercises, lunges, hip abduction exercises, and side planks. He reports no changes in his medical history or family health history. He has no issues with eating or swallowing, noting an improvement in swallowing following dental work to address previous dental overcrowding that led to aspiration. He is currently prescribed baclofen on an as-needed basis and Myrbetriq. He does not require self-catheterization.    Health Maintenance: Completed    Objective:     Exam:  /64 (BP Location: Left arm, Patient Position: Sitting, BP Cuff Size: Adult)   Pulse 61   Temp 37 °C (98.6 °F) (Temporal)   Resp 14   Ht 1.753 m (5' 9\")   Wt 60.7 kg (133 lb 12.8 oz)   SpO2 99%   BMI 19.76 kg/m²  Body mass index is 19.76 kg/m².    Physical Exam  Vitals reviewed.   Constitutional:       Appearance: Normal appearance.   HENT:      Head: Normocephalic and atraumatic.      Nose: Nose normal.   Cardiovascular:      Rate and Rhythm: Normal rate and regular rhythm.      Pulses: Normal pulses.      Heart sounds: Normal heart sounds. No murmur heard.  Pulmonary:      Effort: Pulmonary effort is normal.      Breath sounds: Normal breath sounds.   Abdominal:      General: Abdomen is flat.      Palpations: Abdomen is soft.   Skin:     General: Skin is warm and dry.   Neurological:      General: No focal deficit present.      Mental Status: He is alert and " oriented to person, place, and time. Mental status is at baseline.      Motor: Weakness present.      Gait: Gait abnormal.   Psychiatric:         Mood and Affect: Mood normal.         Behavior: Behavior normal.             Results  Labs   - A1c: 02/2025, 5.8    Assessment & Plan:     1. Spasticity due to old stroke        2. Facial weakness as late effect of cerebrovascular accident (CVA)        3. Abnormality of gait as late effect of cerebrovascular accident (CVA)        4. Prediabetes  POCT  A1C          Assessment & Plan  1. 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  - Blood pressure, pulse, temperature, and oxygen saturation levels are all within normal ranges. Weight is healthy, albeit on the lower side.  -Baclofen and Myrbetriq are being taken as needed. No assistive devices are used except for a knob for the car and a left foot gas pedal. A repeat of lab tests will be scheduled in approximately 6 months.    2. Prediabetes  - Glucose levels are satisfactory, and all other lab results are within normal limits. A fingerstick test will be conducted today to monitor glucose levels.              Return in about 6 months (around 1/31/2026) for Annual/wellness visit.    Please note that this dictation was created using voice recognition software. I have made every reasonable attempt to correct obvious errors, but I expect that there are errors of grammar and possibly content that I did not discover before finalizing the note.    Brittny Marte MD  Family Medicine and Non - Operative Sports Medicine   [facility]- [place]

## 2025-08-06 ENCOUNTER — APPOINTMENT (OUTPATIENT)
Dept: PHYSICAL THERAPY | Facility: REHABILITATION | Age: 45
End: 2025-08-06
Attending: FAMILY MEDICINE
Payer: MEDICARE